# Patient Record
Sex: FEMALE | Race: WHITE | NOT HISPANIC OR LATINO | Employment: FULL TIME | URBAN - METROPOLITAN AREA
[De-identification: names, ages, dates, MRNs, and addresses within clinical notes are randomized per-mention and may not be internally consistent; named-entity substitution may affect disease eponyms.]

---

## 2019-02-11 ENCOUNTER — HISTORICAL (OUTPATIENT)
Dept: ADMINISTRATIVE | Facility: HOSPITAL | Age: 54
End: 2019-02-11

## 2019-02-11 LAB
ABS NEUT (OLG): 3.8 X10(3)/MCL (ref 2.1–9.2)
ALBUMIN SERPL-MCNC: 4.8 GM/DL (ref 3.4–5)
ALBUMIN/GLOB SERPL: 1.85 {RATIO} (ref 1.5–2.5)
ALP SERPL-CCNC: 65 UNIT/L (ref 38–126)
ALT SERPL-CCNC: 41 UNIT/L (ref 7–52)
APPEARANCE, UA: NORMAL
AST SERPL-CCNC: 61 UNIT/L (ref 15–37)
BACTERIA #/AREA URNS AUTO: NORMAL /HPF
BILIRUB SERPL-MCNC: 0.3 MG/DL (ref 0.2–1)
BILIRUB UR QL STRIP: NEGATIVE MG/DL
BILIRUBIN DIRECT+TOT PNL SERPL-MCNC: 0.1 MG/DL (ref 0–0.5)
BILIRUBIN DIRECT+TOT PNL SERPL-MCNC: 0.2 MG/DL
BUN SERPL-MCNC: 13 MG/DL (ref 7–18)
CALCIUM SERPL-MCNC: 9.5 MG/DL (ref 8.5–10)
CHLORIDE SERPL-SCNC: 99 MMOL/L (ref 98–107)
CHOLEST SERPL-MCNC: 183 MG/DL (ref 0–200)
CHOLEST/HDLC SERPL: 3.2 {RATIO}
CO2 SERPL-SCNC: 24 MMOL/L (ref 21–32)
COLOR UR: YELLOW
CREAT SERPL-MCNC: 0.57 MG/DL (ref 0.6–1.3)
CREAT UR-MCNC: 300 MG/DL
ERYTHROCYTE [DISTWIDTH] IN BLOOD BY AUTOMATED COUNT: 12.3 % (ref 11.5–17)
EST. AVERAGE GLUCOSE BLD GHB EST-MCNC: 148 MG/DL
GLOBULIN SER-MCNC: 2.6 GM/DL (ref 1.2–3)
GLUCOSE (UA): NEGATIVE MG/DL
GLUCOSE SERPL-MCNC: 166 MG/DL (ref 74–106)
HBA1C MFR BLD: 6.8 % (ref 4.4–6.4)
HCT VFR BLD AUTO: 42.6 % (ref 37–47)
HDLC SERPL-MCNC: 58 MG/DL (ref 35–60)
HGB BLD-MCNC: 13.5 GM/DL (ref 12–16)
HGB UR QL STRIP: NEGATIVE UNIT/L
KETONES UR QL STRIP: NEGATIVE MG/DL
LDLC SERPL CALC-MCNC: 83 MG/DL (ref 0–129)
LEUKOCYTE ESTERASE UR QL STRIP: NEGATIVE UNIT/L
LYMPHOCYTES # BLD AUTO: 1.5 X10(3)/MCL (ref 0.6–3.4)
LYMPHOCYTES NFR BLD AUTO: 25.3 % (ref 13–40)
MCH RBC QN AUTO: 30.9 PG (ref 27–31.2)
MCHC RBC AUTO-ENTMCNC: 32 GM/DL (ref 32–36)
MCV RBC AUTO: 98 FL (ref 80–94)
MICROALBUMIN UR-MCNC: 10 MG/L
MICROALBUMIN/CREAT RATIO PNL UR: <30 MG/GM
MONOCYTES # BLD AUTO: 0.5 X10(3)/MCL (ref 0.1–1.3)
MONOCYTES NFR BLD AUTO: 8.8 % (ref 0.1–24)
NEUTROPHILS NFR BLD AUTO: 65.9 % (ref 47–80)
NITRITE UR QL STRIP.AUTO: NEGATIVE
PH UR STRIP: 5 [PH]
PLATELET # BLD AUTO: 355 X10(3)/MCL (ref 130–400)
PMV BLD AUTO: 8.4 FL (ref 9.4–12.4)
POTASSIUM SERPL-SCNC: 4.2 MMOL/L (ref 3.5–5.1)
PROT SERPL-MCNC: 7.4 GM/DL (ref 6.4–8.2)
PROT UR QL STRIP: NEGATIVE MG/DL
RBC # BLD AUTO: 4.37 X10(6)/MCL (ref 4.2–5.4)
RBC #/AREA URNS HPF: NORMAL /HPF
SODIUM SERPL-SCNC: 136 MMOL/L (ref 136–145)
SP GR UR STRIP: 1.01
SQUAMOUS EPITHELIAL, UA: NORMAL /LPF
TRIGL SERPL-MCNC: 216 MG/DL (ref 30–150)
UROBILINOGEN UR STRIP-ACNC: 0.2 MG/DL
VLDLC SERPL CALC-MCNC: 43.2 MG/DL
WBC # SPEC AUTO: 5.8 X10(3)/MCL (ref 4.5–11.5)
WBC #/AREA URNS AUTO: NORMAL /[HPF]

## 2020-02-12 ENCOUNTER — HISTORICAL (OUTPATIENT)
Dept: ADMINISTRATIVE | Facility: HOSPITAL | Age: 55
End: 2020-02-12

## 2020-02-12 ENCOUNTER — HISTORICAL (OUTPATIENT)
Dept: LAB | Facility: HOSPITAL | Age: 55
End: 2020-02-12

## 2020-02-12 LAB
APPEARANCE, UA: ABNORMAL
BACTERIA #/AREA URNS AUTO: ABNORMAL /HPF
BILIRUB UR QL STRIP: NEGATIVE MG/DL
CHOLEST SERPL-MCNC: 180 MG/DL (ref 0–200)
CHOLEST/HDLC SERPL: 3.1 {RATIO}
COLOR UR: YELLOW
CREAT UR-MCNC: 100 MG/DL
EST. AVERAGE GLUCOSE BLD GHB EST-MCNC: 137 MG/DL
GLUCOSE (UA): NEGATIVE MG/DL
HBA1C MFR BLD: 6.4 % (ref 4.4–6.4)
HDLC SERPL-MCNC: 58 MG/DL (ref 35–60)
HGB UR QL STRIP: ABNORMAL UNIT/L
KETONES UR QL STRIP: NEGATIVE MG/DL
LDLC SERPL CALC-MCNC: 84 MG/DL (ref 0–129)
LEUKOCYTE ESTERASE UR QL STRIP: ABNORMAL UNIT/L
MICROALBUMIN UR-MCNC: 10 MG/L
MICROALBUMIN/CREAT RATIO PNL UR: <30 MG/GM
NITRITE UR QL STRIP.AUTO: NEGATIVE
PH UR STRIP: 5.5 [PH]
PROT UR QL STRIP: NEGATIVE MG/DL
RBC #/AREA URNS HPF: ABNORMAL /HPF
SP GR UR STRIP: 1.02
SQUAMOUS EPITHELIAL, UA: ABNORMAL /LPF
TRIGL SERPL-MCNC: 249 MG/DL (ref 30–150)
UROBILINOGEN UR STRIP-ACNC: 0.2 MG/DL
VLDLC SERPL CALC-MCNC: 49.8 MG/DL
WBC #/AREA URNS AUTO: ABNORMAL /[HPF]

## 2021-02-17 ENCOUNTER — HISTORICAL (OUTPATIENT)
Dept: ADMINISTRATIVE | Facility: HOSPITAL | Age: 56
End: 2021-02-17

## 2021-02-17 LAB
CHOLEST SERPL-MCNC: 161 MG/DL (ref 0–200)
CHOLEST/HDLC SERPL: 3.3 {RATIO}
CREAT UR-MCNC: 50 MG/DL
EST. AVERAGE GLUCOSE BLD GHB EST-MCNC: 140 MG/DL
HBA1C MFR BLD: 6.5 % (ref 4.4–6.4)
HDLC SERPL-MCNC: 49 MG/DL (ref 35–60)
LDLC SERPL CALC-MCNC: 73 MG/DL (ref 0–129)
MICROALBUMIN UR-MCNC: 10 MG/L
MICROALBUMIN/CREAT RATIO PNL UR: <30 MG/GM
TRIGL SERPL-MCNC: 244 MG/DL (ref 30–150)
VLDLC SERPL CALC-MCNC: 48.8 MG/DL

## 2022-04-09 ENCOUNTER — HISTORICAL (OUTPATIENT)
Dept: ADMINISTRATIVE | Facility: HOSPITAL | Age: 57
End: 2022-04-09

## 2022-04-29 VITALS
BODY MASS INDEX: 28.05 KG/M2 | OXYGEN SATURATION: 99 % | DIASTOLIC BLOOD PRESSURE: 60 MMHG | WEIGHT: 142.88 LBS | HEIGHT: 60 IN | SYSTOLIC BLOOD PRESSURE: 110 MMHG

## 2022-05-02 NOTE — HISTORICAL [facility] CERNER
This is a historical note converted from [facility]. Formatting and pictures may have been removed.  Please reference Rosalba for original formatting and attached multimedia. Chief Complaint  Annual wellness physical- NPO  History of Present Illness  Patient presents for wellness examination.  Since her last office visit,?she was diagnosed with infiltrating?ductal carcinoma of the left breast in?June.??She subsequently underwent a lumpectomy with Dr Roe and had?radiation treatment. ?She is currently on tamoxifen therapy.? She is followed by Dr. Browne.? She last saw her 1/28/2021.  She falls asleep but has problems staying asleep. She feels rested in am. She gets at least 6 hours a night. Her sleep has been affected by her allergies lately.  Her appetite is good.  She walks. She likes to do yard work.  She is a  at [employer].  She is  with 2 daughters: 27 and 19.  She has alcohol once to twice a week.  She does not smoke.  She has tea or coffee once a day.  Colonoscopy 2018: Dr Wolfe; polyps x 2, follow up 5 years.  GYN: Dr Karin Alvarez.  She got a flu shot at pharmacy.  Review of Systems  Constitutional:?no?weight gain,?no?weight loss,?no?fatigue, ?no?fever, ?no?chills, ?no?weakness, ?no?trouble sleeping  Eyes: ?no?vision loss/changes,?+?glasses,??no?pain,??no?redness,??no?blurry or double vision,??no?flashing lights,??no?glaucoma,??no?cataracts  Last eye exam:? July 2020, report in chart  Neck: ?no?lymphadenopathy,??no?thyroid abnormalities,??no?bruits,??no?stiffness  Ears:?no?decreased hearing,??no?tinnitus,??no?earache,??no?drainage?  Nose:?no?congestion,??no?rhinorrhea,??no?epistaxis,??no?sinus pressure, + seasonal allergies, takes Claritin as needed  Throat/Oral:?no?sore throat,??no?hoarseness, ?no?dental caries,??no?gum bleeding,??no?oral lesions  Cardiovascular:?no?chest pain, palpitations, or tightness,?no?dyspnea with exertion,??no?orthopnea,??no?paroxysmal nocturnal dyspnea, +  hypertension, + hypercholesteremia  Respiratory:?no?cough,?no?sputum,??no?hemoptysis,??no?dyspnea,??no?wheezing,??no?pleuritic chest pain?  Gastrointestinal:?no?abdominal pain,?no?nausea,?no?vomiting,??no?heartburn,??no?dysphagia or odynophagia,??no?diarrhea,??no?constipation,??no?melena,??no?hematochezia,?no?jaundice  Urinary:?no?frequency,??no?urgency,??no?burning or pain,?no?hematuria,??no?incontinence,??no?hesitancy,??no?incomplete voiding,??no?flank pain,??no?nocturia, had?a UTI in November.  Musculoskeletal:?no?myalgias,?+?arthralgias: hands,?+?neck pain, sees a chiropractor,??no?back pain,??no?swelling of extremities  Skin:?no?rashes,?no?sores,?no?non-healing wounds  Neurologic:?no?headaches,?no?dizziness/lightheadedness,?no?tremors,?no?paresthesias,??no?seizures,??no?muscle weakness  Psychiatric:?no?depression/sadness,?no?anhedonia,?no?irritability,?no?suicidal ideations,?no?anxiety,?no?panic attacks  Endocrine:?no?hot or cold intolerance,?+ nite?sweats,?no?polyuria,?no?polydipsia,?no?polyphagia, + infiltrating ductal carcinoma  Hematologic:?no?bruising,??no?bleeding disorders?  Physical Exam  Vitals & Measurements  T:?36.9? ?C (Oral)? HR:?103(Peripheral)? BP:?110/60? SpO2:?99%?  HT:?152.00?cm? WT:?64.800?kg? BMI:?28.05? LMP:?11/17/2020 00:00 CST?  PHYSICAL EXAMINATION:  GENERAL: The patient is a well-developed, well-nourished white?female in no?apparent distress. She is alert and oriented x 4.  HEENT: Head is normocephalic and atraumatic. Extraocular muscles are intact. Pupils are equal, round, and reactive to light and accommodation. Nares appeared normal. Mouth is well hydrated and without lesions. Mucous membranes are moist. Posterior pharynx clear of any exudate or lesions.  NECK: Supple. No carotid bruits.? No lymphadenopathy or thyromegaly.  LUNGS: Clear to auscultation.  HEART: Regular rate and rhythm without murmur, gallops or rubs.  ABDOMEN: Soft, nontender, and nondistended.? Positive bowel  sounds.? No hepatosplenomegaly was noted.  EXTREMITIES: Without any cyanosis, clubbing, rash, lesions or edema. Bilateral feet:?Intact to 10 g monofilament x5;?no ulcers, sores or calluses.  NEUROLOGIC: Cranial nerves II through XII are grossly intact.? No motor or sensory deficits.? Cerebellar function intact.  SKIN: No ulceration or induration present.  ?  ?  Assessment/Plan  1.?Wellness examination?Z00.00  Patient presents for wellness examination.  She has been doing well.  She continues on tamoxifen therapy.  She is up-to-date with her colonoscopies.  I advised patient to consider a Shingrix vaccine.  Labs pending:?A1c, microalbumin?and lipid level.  Ordered:  Clinic Follow-Up Wellness, *Est. 02/17/22 3:00:00 CST, Order for future visit, Wellness examination, [facility] Care Est 40-64 years [id number] PC, Wellness examination  Type 2 diabetes mellitus  Hypercholesteremia  Hypertension  Infiltrating ductal carcinoma of left breast, [facility] - [facility], 02/17/21 8:28:00 CST  ?  2.?Type 2 diabetes mellitus?E11.9  A1c, lipid profile microalbumin levels pending.  Eye examination in chart.  Foot exam performed today.  Ordered:  Hemoglobin A1c, Routine collect, 02/17/21 8:38:00 CST, Blood, Stop date 02/17/21 8:38:00 CST, Lab Collect, Type 2 diabetes mellitus, 02/17/21 8:38:00 CST  Lipid Panel, Routine collect, 02/17/21 8:38:00 CST, Blood, Stop date 02/17/21 8:38:00 CST, Lab Collect, Hypercholesteremia  Type 2 diabetes mellitus  Hypertension, 02/17/21 8:38:00 CST  Microalbumin Urine, Routine collect, Urine, 02/17/21 8:38:00 CST, Stop date 02/17/21 8:38:00 CST, Nurse collect, Type 2 diabetes mellitus  [facility] Est 40-64 years [id number] PC, Wellness examination  Type 2 diabetes mellitus  Hypercholesteremia  Hypertension  Infiltrating ductal carcinoma of left breast, [facility]B - [facility], 02/17/21 8:28:00 CST  ?  3.?Hypercholesteremia?E78.00  ?Lipid profile pending.  Refills for simvastatin  sent.  Ordered:  Lipid Panel, Routine collect, 02/17/21 8:38:00 CST, Blood, Stop date 02/17/21 8:38:00 CST, Lab Collect, Hypercholesteremia  Type 2 diabetes mellitus  Hypertension, 02/17/21 8:38:00 CST  [facility] Care Est 40-64 years [id number] PC, Wellness examination  Type 2 diabetes mellitus  Hypercholesteremia  Hypertension  Infiltrating ductal carcinoma of left breast, [facility] - [facility], 02/17/21 8:28:00 CST  ?  4.?Hypertension?I10  Well-controlled; continue current medications, refills sent.  Ordered:  Lipid Panel, Routine collect, 02/17/21 8:38:00 CST, Blood, Stop date 02/17/21 8:38:00 CST, Lab Collect, Hypercholesteremia  Type 2 diabetes mellitus  Hypertension, 02/17/21 8:38:00 CST  [facility] Est 40-64 years [id number] PC, Wellness examination  Type 2 diabetes mellitus  Hypercholesteremia  Hypertension  Infiltrating ductal carcinoma of left breast, [facility] - [facility], 02/17/21 8:28:00 CST  ?  5.?Infiltrating ductal carcinoma of left breast?C50.912  She is currently on tamoxifen therapy; followed by Dr. Browne.  Ordered:  [facility] Est 40-64 years [id number] PC, Wellness examination  Type 2 diabetes mellitus  Hypercholesteremia  Hypertension  Infiltrating ductal carcinoma of left breast, [facility] - [facility], 02/17/21 8:28:00 CST  ?  6.?Insomnia?G47.00  ?Stable;?she takes 1 Xanax in evening.  ?  Orders:  alPRAzolam, See Instructions, TAKE 1 TABLET BY MOUTH EVERY DAY AS NEEDED, # 90 tab(s), 1 Refill(s), Pharmacy: [facility] #[id number], 152, cm, Height/Length Dosing, 02/17/21 7:46:00 CST, 64.8, kg, Weight Dosing, 02/17/21 7:46:00 CST  amLODIPine, See Instructions, TAKE 1 TABLET BY MOUTH DAILY, # 90 tab(s), 3 Refill(s), Pharmacy: [facility] #[id number], 152, cm, Height/Length Dosing, 02/17/21 7:46:00 CST, 64.8, kg, Weight Dosing, 02/17/21 7:46:00 CST  hydrochlorothiazide-valsartan, See Instructions, TAKE 1 TABLET BY MOUTH DAILY, # 90 tab(s), 3 Refill(s), Pharmacy:  [facility] #[id number], 152, cm, Height/Length Dosing, 02/17/21 7:46:00 CST, 64.8, kg, Weight Dosing, 02/17/21 7:46:00 CST  metFORMIN, See Instructions, TAKE 1 TABLET BY MOUTH TWICE DAILY, # 180 tab(s), 3 Refill(s), Pharmacy: [facility] #[id number], 152, cm, Height/Length Dosing, 02/17/21 7:46:00 CST, 64.8, kg, Weight Dosing, 02/17/21 7:46:00 CST  metoprolol, 50 mg = 1 tab(s), Oral, Daily, # 90 tab(s), 3 Refill(s), Pharmacy: [facility] #[id number], 152, cm, Height/Length Dosing, 02/17/21 7:46:00 CST, 64.8, kg, Weight Dosing, 02/17/21 7:46:00 CST  simvastatin, See Instructions, TAKE 1 TABLET BY MOUTH EVERY DAY AFTER EVENING MEAL, # 90 tab(s), 3 Refill(s), Pharmacy: [facility] #[id number], 152, cm, Height/Length Dosing, 02/17/21 7:46:00 CST, 64.8, kg, Weight Dosing, 02/17/21 7:46:00 CST  Referrals  Clinic Follow-Up Wellness, *Est. 02/17/22 3:00:00 CST, Order for future visit, Wellness examination, ink AFP   Problem List/Past Medical History  Ongoing  Hypercholesteremia  Hypertension  Infiltrating ductal carcinoma of left breast  Insomnia  Obesity  Type 2 diabetes mellitus  Historical  No qualifying data  Medications  alPRAzolam 1 mg oral tablet, See Instructions, 1 refills  amlodipine 10 mg oral tablet, See Instructions, 3 refills  Claritin 10 mg oral tablet, 10 mg= 1 tab(s), Oral, Daily, PRN  hydrochlorothiazide-valsartan 25 mg-320 mg oral tablet, See Instructions, 3 refills  latanoprost 0.005% ophthalmic solution, 1 drop(s), Eye-Both, qPM  metformin 500 mg oral tablet, See Instructions, 3 refills  metoprolol succinate 50 mg oral tablet extended release, 50 mg= 1 tab(s), Oral, Daily, 3 refills  MVI with Minerals (Adult Tab), 1 tab(s), Oral, Daily  simvastatin 40 mg oral tablet, See Instructions, 3 refills  tamoxifen 20 mg oral tablet, 20 mg= 1 tab(s), Oral, Daily  Allergies  sulfa drugs?(SOB - Shortness of breath, Rash)  Social History  Abuse/Neglect  No, 02/12/2020  Alcohol  Current, 1-2 times  per week, 11/08/2018  Employment/School  Employed, Work/School description: ., 11/08/2018  Exercise  Exercise type: Walking, GARDENING., 11/08/2018  Home/Environment  Lives with Children, Spouse. Living situation: Home/Independent., 11/08/2018  Nutrition/Health  Regular, Caffeine intake amount: 2 CUPS PER DAY., 11/08/2018  Substance Use  Never, 11/08/2018  Tobacco  Former smoker, quit more than 30 days ago, N/A, 02/12/2020  Former smoker, quit more than 30 days ago, N/A, 02/11/2019  Former smoker, N/A, 11/08/2018  Former smoker, Cigarettes, Started age 18 Years. Stopped age 27 Years., 02/22/2018  Family History  Diabetes mellitus: Father.  Immunizations  Vaccine Date Status   pneumococcal 23-polyvalent vaccine 02/09/2018 Recorded   pneumococcal 13-valent conjugate vaccine 06/29/2015 Recorded   influenza virus vaccine, inactivated 11/25/2014 Recorded   influenza virus vaccine, inactivated 09/15/2013 Recorded   tetanus/diphtheria/pertussis, acel(Tdap) 12/13/2012 Recorded   tetanus-diphth toxoids (Td) adult/adol 11/01/2004 Recorded   Health Maintenance  Health Maintenance  ???Pending?(in the next year)  ??? ??OverDue  ??? ? ? ?Cervical Cancer Screening due??06/21/13??and every 3??year(s)  ??? ? ? ?Hypertension Management-BMP due??02/11/20??and every 1??year(s)  ??? ? ? ?Diabetes Maintenance-Serum Creatinine due??02/11/20??and every 1??year(s)  ??? ? ? ?Influenza Vaccine due??10/01/20??and every 1??day(s)  ??? ? ? ?Diabetes Maintenance-HgbA1c due??02/11/21??and every 1??year(s)  ??? ? ? ?Diabetes Maintenance-Fasting Lipid Profile due??02/12/21??and every 1??year(s)  ??? ??Due?  ??? ? ? ?Alcohol Misuse Screening due??01/02/21??and every 1??year(s)  ??? ? ? ?ADL Screening due??02/17/21??and every 1??year(s)  ??? ? ? ?Aspirin Therapy for CVD Prevention due??02/17/21??and every 1??year(s)  ??? ? ? ?Depression Screening due??02/17/21??Unknown Frequency  ??? ? ? ?Diabetes Maintenance-Foot Exam  due??02/17/21??Unknown Frequency  ??? ? ? ?Hypertension Management-Education due??02/17/21??and every 1??year(s)  ??? ? ? ?Zoster Vaccine due??02/17/21??Unknown Frequency  ??? ??Due In Future?  ??? ? ? ?Diabetes Maintenance-Eye Exam not due until??07/29/21??and every 1??year(s)  ??? ? ? ?Obesity Screening not due until??01/01/22??and every 1??year(s)  ???Satisfied?(in the past 1 year)  ??? ??Satisfied?  ??? ? ? ?Blood Pressure Screening on??02/17/21.??Satisfied by Yuni Dyer LPN  ??? ? ? ?Body Mass Index Check on??02/17/21.??Satisfied by Yuni Dyer LPN  ??? ? ? ?Breast Cancer Screening on??12/29/20.??Satisfied by Thais Liang  ??? ? ? ?Diabetes Maintenance-Eye Exam on??07/29/20.??Satisfied by Thais Liang  ??? ? ? ?Hypertension Management-Blood Pressure on??02/17/21.??Satisfied by Yuni Dyer LPN  ??? ? ? ?Obesity Screening on??02/17/21.??Satisfied by Yuni Dyer LPN  ?      She does have glaucoma which is well controlled.?She also has early cataracts.

## 2022-05-02 NOTE — HISTORICAL [facility] CERNER
This is a historical note converted from [facility]. Formatting and pictures may have been removed.  Please reference Rosalba for original formatting and attached multimedia. Chief Complaint  ANNUAL WELLNESS PHYSICAL- NPO  History of Present Illness  Patient presents for wellness examination.  Since her last office visit,?she was diagnosed with infiltrating?ductal carcinoma of the left breast in?June.??She subsequently underwent a lumpectomy with Dr Roe and had?radiation treatment. ?She is currently on tamoxifen therapy.? She is followed by Dr. Browne.  She falls asleep but has problems staying asleep. She feels rested in am. She gets at least 6 hours a night.  Her appetite is good.  She walks. She likes to do yard work.  She is a  at [employer].  She is  with 2 daughters: 26 and 18.  She has alcohol once to twice a week.  She does not smoke.  She has tea or coffee once a day.  Colonoscopy 2018: Dr Wolfe; polyps x 2, follow up 5 years.  Gyn: Dr Karin Alvarez.  She got a flu shot at pharmacy.  Review of Systems  Constitutional:?no?weight gain,?no?weight loss,?no?fatigue, ?no?fever, ?no?chills, ?no?weakness, ?no?trouble sleeping  Eyes: ?no?vision loss/changes,?+?glasses,??no?pain,??no?redness,??no?blurry or double vision,??no?flashing lights,??no?glaucoma,??no?cataracts  Last eye exam:?2018  Neck: ?no?lymphadenopathy,??no?thyroid abnormalities,??no?bruits,??no?stiffness  Ears:?no?decreased hearing,??no?tinnitus,??no?earache,??no?drainage?  Nose:?no?congestion,??no?rhinorrhea,??no?epistaxis,??no?sinus pressure, + seasonal allergies, takes Claritin as needed  Throat/Oral:?no?sore throat,??no?hoarseness, ?no?dental caries,??no?gum bleeding,??no?oral lesions  Cardiovascular:?no?chest pain, palpitations, or tightness,?no?dyspnea with exertion,??no?orthopnea,??no?paroxysmal nocturnal dyspnea  Respiratory:?no?cough,?no?sputum,??no?hemoptysis,??no?dyspnea,??no?wheezing,??no?pleuritic chest  pain?  Gastrointestinal:?no?abdominal pain,?no?nausea,?no?vomiting,??no?heartburn,??no?dysphagia or odynophagia,??no?diarrhea,??no?constipation,??no?melena,??no?hematochezia,?no?jaundice  Urinary:?no?frequency,??no?urgency,??no?burning or pain,?no?hematuria,??no?incontinence,??no?hesitancy,??no?incomplete voiding,??no?flank pain,??no?nocturia  Musculoskeletal:?no?myalgias,?+?arthralgias: hands,?+?neck pain, sees a chiropractor,??no?back pain,??no?swelling of extremities  Skin:?no?rashes,?no?sores,?no?non-healing wounds  Neurologic:?no?headaches,?no?dizziness/lightheadedness,?no?tremors,?no?paresthesias,??no?seizures,??no?muscle weakness  Psychiatric:?no?depression/sadness,?no?anhedonia,?no?irritability,?no?suicidal ideations,?no?anxiety,?no?panic attacks  Endocrine:?no?hot or cold intolerance,?no?sweating,?no?polyuria,?no?polydipsia,?no?polyphagia  Hematologic:?no?bruising,??no?bleeding disorders?  Physical Exam  Vitals & Measurements  HR:?103(Peripheral)? BP:?100/60? SpO2:?98%?  HT:?152?cm? WT:?67.7?kg? BMI:?29.3? LMP:?07/22/2019 00:00 CDT?  PHYSICAL EXAMINATION:  GENERAL: The patient is a well-developed, well-nourished white?female in no?apparent distress. She is alert and oriented x 4.  HEENT: Head is normocephalic and atraumatic. Extraocular muscles are intact. Pupils are equal, round, and reactive to light and accommodation. Nares appeared normal. Mouth is well hydrated and without lesions. Mucous membranes are moist. Posterior pharynx clear of any exudate or lesions.  NECK: Supple. No carotid bruits.? No lymphadenopathy or thyromegaly.  LUNGS: Clear to auscultation.  HEART: Regular rate and rhythm without murmur.  ABDOMEN: Soft, nontender, and nondistended.? Positive bowel sounds.? No hepatosplenomegaly was noted.  EXTREMITIES: Without any cyanosis, clubbing, rash, lesions or edema. ?Bilateral feet:?Intact to 10 g monofilament x 5 bilaterally, no ulcers, sores or calluses.  NEUROLOGIC: Cranial nerves II  through XII are grossly intact.? No motor or sensory deficits.? Cerebellar function intact.  SKIN: No ulceration or induration present.  ?  Assessment/Plan  1.?Wellness examination?Z00.00  ?Patient presents for wellness examination.? She looks well.  Patient was diagnosed with?infiltrating ductal carcinoma of her left breast; she is doing well.  Patient had a colonoscopy in 2018.  She had a flu shot.  We will check her immunity status for varicella in regards to her Zostavax.  ?  Ordered:  Clinic Follow-Up Wellness, *Est. 02/12/21 3:00:00 CST, Order for future visit, Wellness examination, [facility] AFP  Lipid Panel, Routine collect, 02/12/20 8:48:00 CST, Blood, Stop date 02/12/20 8:48:00 CST, Lab Collect, Wellness examination  Hypercholesteremia  Type 2 diabetes mellitus  Hypertension, 02/12/20 8:48:00 CST  Preventative Health Care Est 40-64 years [id number] PC, Wellness examination  Type 2 diabetes mellitus  Hypertension  Hypercholesteremia  Insomnia  Infiltrating ductal carcinoma of left breast, HLINK AMB - AFP, 02/12/20 8:42:00 CST  Urinalysis Complete no reflex, Routine collect, Urine, 02/12/20 8:48:00 CST, Stop date 02/12/20 8:48:00 CST, Nurse collect, Wellness examination  Type 2 diabetes mellitus  Hypertension  ?  2.?Type 2 diabetes mellitus?E11.9  Check A1c today.  Microalbumin level pending.  Foot exam performed today.  Patient to have eye examination?soon.? Her last?eye examination was in 2018.  Ordered:  Hemoglobin A1c, Routine collect, 02/12/20 8:48:00 CST, Blood, Stop date 02/12/20 8:48:00 CST, Lab Collect, Type 2 diabetes mellitus, 02/12/20 8:48:00 CST  Lipid Panel, Routine collect, 02/12/20 8:48:00 CST, Blood, Stop date 02/12/20 8:48:00 CST, Lab Collect, Wellness examination  Hypercholesteremia  Type 2 diabetes mellitus  Hypertension, 02/12/20 8:48:00 CST  Microalbumin Urine, Routine collect, Urine, 02/12/20 8:48:00 CST, Stop date 02/12/20 8:48:00 CST, Nurse collect, Type 2 diabetes  mellitus  [facility] Est 40-64 years [id number] PC, Wellness examination  Type 2 diabetes mellitus  Hypertension  Hypercholesteremia  Insomnia  Infiltrating ductal carcinoma of left breast, HLINK AMB - AFP, 02/12/20 8:42:00 CST  Urinalysis Complete no reflex, Routine collect, Urine, 02/12/20 8:48:00 CST, Stop date 02/12/20 8:48:00 CST, Nurse collect, Wellness examination  Type 2 diabetes mellitus  Hypertension  ?  3.?Hypertension?I10  ?Well-controlled; continue current medications.  Ordered:  Lipid Panel, Routine collect, 02/12/20 8:48:00 CST, Blood, Stop date 02/12/20 8:48:00 CST, Lab Collect, Wellness examination  Hypercholesteremia  Type 2 diabetes mellitus  Hypertension, 02/12/20 8:48:00 CST  [facility] 40-64 years [id number] PC, Wellness examination  Type 2 diabetes mellitus  Hypertension  Hypercholesteremia  Insomnia  Infiltrating ductal carcinoma of left breast, HLINK AMB - AFP, 02/12/20 8:42:00 CST  Urinalysis Complete no reflex, Routine collect, Urine, 02/12/20 8:48:00 CST, Stop date 02/12/20 8:48:00 CST, Nurse collect, Wellness examination  Type 2 diabetes mellitus  Hypertension  ?  4.?Hypercholesteremia?E78.00  Check lipid panel today.  Ordered:  Lipid Panel, Routine collect, 02/12/20 8:48:00 CST, Blood, Stop date 02/12/20 8:48:00 CST, Lab Collect, Wellness examination  Hypercholesteremia  Type 2 diabetes mellitus  Hypertension, 02/12/20 8:48:00 CST  [facility] Est 40-64 years [id number] PC, Wellness examination  Type 2 diabetes mellitus  Hypertension  Hypercholesteremia  Insomnia  Infiltrating ductal carcinoma of left breast, [facility]LINK AMB - AFP, 02/12/20 8:42:00 CST  ?  5.?Insomnia?G47.00  She states her sleeping is actually improved and she is getting more hours;?she declines any?medication at this time.  Ordered:  [facility] 40-64 years [id number] PC, Wellness examination  Type 2 diabetes mellitus  Hypertension  Hypercholesteremia   Insomnia  Infiltrating ductal carcinoma of left breast, [facility]LINK AMB - AFP, 02/12/20 8:42:00 CST  ?  6.?Infiltrating ductal carcinoma of left breast?C50.912  She is currently on?tamoxifen; followed by Dr. Browne.  She is status post lumpectomy and radiation treatment.  Ordered:  [facility] Care Est 40-64 years [id number] PC, Wellness examination  Type 2 diabetes mellitus  Hypertension  Hypercholesteremia  Insomnia  Infiltrating ductal carcinoma of left breast, [facility]LINK AMB - AFP, 02/12/20 8:42:00 CST  ?  7.?Immunity status testing?Z01.84  ?Patient states she does not have a history of chickenpox.? Will check immunity status for Zostavax purposes.  Ordered:  Varicella Zoster Virus IgG Ab-[facility] [id number], Routine collect, 02/12/20 8:41:00 CST, Blood, Order for future visit, Stop date 02/12/20 8:41:00 CST, Lab Collect, Immunity status testing, 02/12/20 8:41:00 CST  ?  Orders:  alPRAzolam, See Instructions, TAKE 1 TABLET BY MOUTH EVERY DAY AS NEEDED, # 90 tab(s), 1 Refill(s), Pharmacy: [facility] #[id number], 152, cm, Height/Length Dosing, 02/12/20 7:41:00 CST, 67.7, kg, Weight Dosing, 02/12/20 7:41:00 CST  amLODIPine, See Instructions, TAKE 1 TABLET BY MOUTH DAILY, # 90 tab(s), 3 Refill(s), Pharmacy: [facility] #[id number], 152, cm, Height/Length Dosing, 02/12/20 7:41:00 CST, 67.7, kg, Weight Dosing, 02/12/20 7:41:00 CST  hydrochlorothiazide-valsartan, See Instructions, TAKE 1 TABLET BY MOUTH DAILY, # 90 tab(s), 3 Refill(s), Pharmacy: [facility] #[id number], 152, cm, Height/Length Dosing, 02/12/20 7:41:00 CST, 67.7, kg, Weight Dosing, 02/12/20 7:41:00 CST  metFORMIN, See Instructions, TAKE 1 TABLET BY MOUTH TWICE DAILY, # 180 tab(s), 3 Refill(s), Pharmacy: [facility] #[id number], 152, cm, Height/Length Dosing, 02/12/20 7:41:00 CST, 67.7, kg, Weight Dosing, 02/12/20 7:41:00 CST  metoprolol, 50 mg, Oral, Daily, # 90 cap(s), 3 Refill(s), Pharmacy: [facility] #[id number], 152, cm,  Height/Length Dosing, 02/12/20 7:41:00 CST, 67.7, kg, Weight Dosing, 02/12/20 7:41:00 CST  simvastatin, See Instructions, TAKE 1 TABLET BY MOUTH EVERY DAY AFTER EVENING MEAL, # 90 tab(s), 3 Refill(s), Pharmacy: [facility] #[id number], 152, cm, Height/Length Dosing, 02/12/20 7:41:00 CST, 67.7, kg, Weight Dosing, 02/12/20 7:41:00 CST  Referrals  Clinic Follow-Up Wellness, *Est. 02/12/21 3:00:00 CST, Order for future visit, Wellness examination, [facility]Link AFP   Problem List/Past Medical History  Ongoing  Hypercholesteremia  Hypertension  Insomnia  Obesity  Type 2 diabetes mellitus  Historical  No qualifying data  Medications  alPRAzolam 1 mg oral tablet, See Instructions, 1 refills  amlodipine 10 mg oral tablet, See Instructions, 3 refills  Claritin 10 mg oral tablet, 10 mg= 1 tab(s), Oral, Daily, PRN  hydrochlorothiazide-valsartan 25 mg-320 mg oral tablet, See Instructions, 3 refills  metformin 500 mg oral tablet, See Instructions, 3 refills  metoprolol succinate 50 mg oral capsule, extended release, 50 mg, Oral, Daily, 3 refills  MVI with Minerals (Adult Tab), 1 tab(s), Oral, Daily  simvastatin 40 mg oral tablet, See Instructions, 3 refills  tamoxifen 20 mg oral tablet, 20 mg= 1 tab(s), Oral, Daily  Allergies  sulfa drugs?(SOB - Shortness of breath, Rash)  Social History  Abuse/Neglect  No, 02/12/2020  Alcohol  Current, 1-2 times per week, 11/08/2018  Employment/School  Employed, Work/School description: ., 11/08/2018  Exercise  Exercise type: Walking, GARDENING., 11/08/2018  Home/Environment  Lives with Children, Spouse. Living situation: Home/Independent., 11/08/2018  Nutrition/Health  Regular, Caffeine intake amount: 2 CUPS PER DAY., 11/08/2018  Substance Use  Never, 11/08/2018  Tobacco  Former smoker, quit more than 30 days ago, N/A, 02/12/2020  Former smoker, quit more than 30 days ago, N/A, 02/11/2019  Former smoker, N/A, 11/08/2018  Former smoker, Cigarettes, Started age 18 Years. Stopped  age 27 Years., 02/22/2018  Family History  Diabetes mellitus: Father.  Immunizations  Vaccine Date Status   pneumococcal 23-polyvalent vaccine 02/09/2018 Recorded   pneumococcal 13-valent conjugate vaccine 06/29/2015 Recorded   influenza virus vaccine, inactivated 11/25/2014 Recorded   influenza virus vaccine, inactivated 09/15/2013 Recorded   tetanus/diphtheria/pertussis, acel(Tdap) 12/13/2012 Recorded   tetanus-diphth toxoids (Td) adult/adol 11/01/2004 Recorded   Health Maintenance  Health Maintenance  ???Pending?(in the next year)  ??? ??OverDue  ??? ? ? ?Diabetes Maintenance-Fasting Lipid Profile due??02/11/20??and every 1??year(s)  ??? ? ? ?Diabetes Maintenance-HgbA1c due??02/11/20??and every 1??year(s)  ??? ? ? ?Hypertension Management-BMP due??02/11/20??and every 1??year(s)  ??? ? ? ?Diabetes Maintenance-Serum Creatinine due??02/11/20??and every 1??year(s)  ??? ? ? ?Diabetes Maintenance-Urine Dipstick due??02/11/20??and every 1??year(s)  ??? ??Due?  ??? ? ? ?Alcohol Misuse Screening due??01/01/20??and every 1??year(s)  ??? ? ? ?ADL Screening due??02/12/20??and every 1??year(s)  ??? ? ? ?Aspirin Therapy for CVD Prevention due??02/12/20??and every 1??year(s)  ??? ? ? ?Depression Screening due??02/12/20??and every?  ??? ? ? ?Diabetes Maintenance-Eye Exam due??02/12/20??and every?  ??? ? ? ?Diabetes Maintenance-Foot Exam due??02/12/20??and every?  ??? ? ? ?Hypertension Management-Education due??02/12/20??and every 1??year(s)  ??? ??Due In Future?  ??? ? ? ?Obesity Screening not due until??01/01/21??and every 1??year(s)  ??? ? ? ?Blood Pressure Screening not due until??02/11/21??and every 1??year(s)  ??? ? ? ?Body Mass Index Check not due until??02/11/21??and every 1??year(s)  ??? ? ? ?Hypertension Management-Blood Pressure not due until??02/11/21??and every 1??year(s)  ???Satisfied?(in the past 1 year)  ??? ??Satisfied?  ??? ? ? ?Blood Pressure Screening on??02/12/20.??Satisfied by Yuni Dyer LPN  ??? ? ?  ?Body Mass Index Check on??02/12/20.??Satisfied by Yuni Dyer LPN  ??? ? ? ?Breast Cancer Screening on??01/28/20.??Satisfied by Thais Liang  ??? ? ? ?Hypertension Management-Blood Pressure on??02/12/20.??Satisfied by Yuni Dyer LPN  ??? ? ? ?Obesity Screening on??02/12/20.??Satisfied by Yuni Dyer LPN  ?

## 2022-08-03 PROBLEM — G47.00 INSOMNIA: Status: ACTIVE | Noted: 2022-08-03

## 2022-08-03 PROBLEM — C50.912 INFILTRATING DUCTAL CARCINOMA OF LEFT BREAST: Status: ACTIVE | Noted: 2022-08-03

## 2022-08-03 PROBLEM — E11.9 TYPE 2 DIABETES MELLITUS WITHOUT COMPLICATIONS: Status: ACTIVE | Noted: 2022-08-03

## 2022-08-03 PROBLEM — I10 ESSENTIAL (PRIMARY) HYPERTENSION: Status: ACTIVE | Noted: 2022-08-03

## 2022-08-03 PROBLEM — E78.00 HYPERCHOLESTEREMIA: Status: ACTIVE | Noted: 2022-08-03

## 2023-03-01 PROBLEM — Z00.00 ENCOUNTER FOR WELLNESS EXAMINATION IN ADULT: Status: ACTIVE | Noted: 2023-03-01

## 2023-03-01 PROBLEM — H40.9 GLAUCOMA: Status: ACTIVE | Noted: 2023-03-01

## 2023-03-03 PROBLEM — Z23 IMMUNIZATION DUE: Status: ACTIVE | Noted: 2023-03-03

## 2023-03-03 PROBLEM — J30.2 SEASONAL ALLERGIES: Status: ACTIVE | Noted: 2023-03-03

## 2023-06-05 PROBLEM — Z00.00 ENCOUNTER FOR WELLNESS EXAMINATION IN ADULT: Status: RESOLVED | Noted: 2023-03-01 | Resolved: 2023-06-05

## 2023-06-14 PROBLEM — M25.562 CHRONIC PAIN OF LEFT KNEE: Status: ACTIVE | Noted: 2023-06-14

## 2023-06-14 PROBLEM — G89.29 CHRONIC PAIN OF LEFT KNEE: Status: ACTIVE | Noted: 2023-06-14

## 2024-03-02 PROBLEM — F51.01 PRIMARY INSOMNIA: Status: ACTIVE | Noted: 2022-08-03

## 2024-03-02 NOTE — PROGRESS NOTES
..Annual Exam       HISTORY OF PRESENT ILLNESS    This 59 y.o. female patient presents to the clinic today for a wellness CPX.  She has been feeling well.  She has been sleeping okay; she has problems staying asleep. She falls asleep well.  Her appetite has been good.  She is  with 2 daughters.  She is a  at [employer].  She does not smoke.  She has alcohol once or twice a week.  Colonoscopy 2023: Dr Wolfe; normal, follow up in 7 years.  Oncology: Dr Browne: 2023. She is retiring; she will be seeing someone new.   Last mammogram: 2023.  She does gardening almost daily. She walks 7000 steps a day.      The patient's Health Maintenance was reviewed and the following appears to be due at this time:   Health Maintenance Due   Topic Date Due    Hepatitis C Screening  Never done    Cervical Cancer Screening  Never done    HIV Screening  Never done    Shingles Vaccine (1 of 2) Never done    Pneumococcal Vaccines (Age 0-64) (3 of 3 - PPSV23 or PCV20) 2023    Lipid Panel  2023    Influenza Vaccine (1) 2023    COVID-19 Vaccine (3 - -24 season) 2023    Diabetes Urine Screening  2024    Hemoglobin A1c  2024       ..  Past Medical History:   Diagnosis Date    Essential (primary) hypertension     Hypercholesteremia     Infiltrating ductal carcinoma of left breast           ..  Past Surgical History:   Procedure Laterality Date    BREAST LUMPECTOMY  2019     SECTION  2001     SECTION  1993    COLONOSCOPY  2018            Current Outpatient Medications:     amLODIPine (NORVASC) 10 MG tablet, Take 1 tablet (10 mg total) by mouth once daily., Disp: 90 tablet, Rfl: 3    brimonidine-timoloL (COMBIGAN) 0.2-0.5 % Drop, INSTILL 1 DROP INTO RIGHT EYE TWICE DAILY TO LOWER INTRAOCULAR PRESSURE, Disp: , Rfl:     latanoprost 0.005 % ophthalmic solution, INSTILL 1 DROP INTO BOTH EYES EVERY NIGHT AT BEDTIME TO LOWER INTRAOCULAR  PRESSURE, Disp: , Rfl:     tamoxifen (NOLVADEX) 20 MG Tab, Take 20 mg by mouth once daily., Disp: , Rfl:     timolol maleate 0.5% (TIMOPTIC) 0.5 % Drop, INSTILL 1 DROP IN BOTH EYES TWICE DAILY TO LOWER INTRAOCULAR PRESSURE, Disp: , Rfl:     ALPRAZolam (XANAX) 1 MG tablet, TAKE 1 TABLET(1 MG) BY MOUTH EVERY NIGHT AS NEEDED FOR INSOMNIA OR ANXIETY, Disp: 90 tablet, Rfl: 1    cyclobenzaprine (FLEXERIL) 5 MG tablet, Take 1 tablet once to twice a day as needed for muscle spasm., Disp: 20 tablet, Rfl: 1    metFORMIN (GLUCOPHAGE) 500 MG tablet, TAKE 1 TABLET BY MOUTH EVERY MORNING AND TAKE 2 TABLET BY MOUTH EVERY EVENING, Disp: 270 tablet, Rfl: 3    metoprolol succinate (TOPROL-XL) 50 MG 24 hr tablet, Take 1 tablet (50 mg total) by mouth once daily., Disp: 90 tablet, Rfl: 3    simvastatin (ZOCOR) 40 MG tablet, TAKE 1 TABLET BY MOUTH EVERY DAY AFTER THE EVENING MEAL, Disp: 90 tablet, Rfl: 3    valsartan-hydrochlorothiazide (DIOVAN-HCT) 320-25 mg per tablet, Take 1 tablet by mouth once daily., Disp: 90 tablet, Rfl: 3      ..  Social History     Socioeconomic History    Marital status:     Number of children: 2   Occupational History    Occupation:    Tobacco Use    Smoking status: Former     Types: Cigarettes    Smokeless tobacco: Never    Tobacco comments:     Age Started: 18; Age Stopped: 27   Substance and Sexual Activity    Alcohol use: Yes     Comment: 1-2 times per week    Drug use: Never     Social Determinants of Health     Financial Resource Strain: Low Risk  (2/26/2024)    Overall Financial Resource Strain (CARDIA)     Difficulty of Paying Living Expenses: Not very hard   Food Insecurity: No Food Insecurity (2/26/2024)    Hunger Vital Sign     Worried About Running Out of Food in the Last Year: Never true     Ran Out of Food in the Last Year: Never true   Transportation Needs: No Transportation Needs (2/26/2024)    PRAPARE - Transportation     Lack of Transportation (Medical): No      Lack of Transportation (Non-Medical): No   Physical Activity: Insufficiently Active (2/26/2024)    Exercise Vital Sign     Days of Exercise per Week: 6 days     Minutes of Exercise per Session: 20 min   Stress: Stress Concern Present (2/26/2024)    [employer] [place] [employer] - Occupational Stress Questionnaire     Feeling of Stress : To some extent   Social Connections: Unknown (2/26/2024)    Social Connection and Isolation Panel [NHANES]     Frequency of Communication with Friends and Family: More than three times a week     Frequency of Social Gatherings with Friends and Family: More than three times a week     Active Member of Clubs or Organizations: No     Attends Club or Organization Meetings: Patient declined     Marital Status:    Housing Stability: Low Risk  (2/26/2024)    Housing Stability Vital Sign     Unable to Pay for Housing in the Last Year: No     Number of Places Lived in the Last Year: 1     Unstable Housing in the Last Year: No          ..  Family History   Problem Relation Age of Onset    Skin cancer Mother     Kidney disease Mother     Diabetes Father     Heart disease Father     Stroke Father           ..  Review of patient's allergies indicates:   Allergen Reactions    Sulfa (sulfonamide antibiotics) Rash and Shortness Of Breath          ..  Immunization History   Administered Date(s) Administered    COVID-19, MRNA, LN-S, PF (Pfizer) (Purple Cap) 03/26/2021, 04/16/2021    Influenza - Trivalent (ADULT) 09/15/2013, 11/25/2014    Influenza - Trivalent - PF (ADULT) 09/15/2013, 11/25/2014    Pneumococcal Conjugate - 13 Valent 06/29/2015    Pneumococcal Polysaccharide - 23 Valent 02/09/2018    Td (ADULT) 11/01/2004    Tdap 12/13/2012, 03/03/2023          REVIEW OF SYSTEMS:    GENERAL:   no unexplained wt gain/loss,  fever, fatigue, chills, night sweats or weakness  HEENT: no sore throat, ear pain, sinus pressure, nasal congestion, or rhinorrhea, + seasonal allergies, take  Claritin as needed  VISION: no vision changes, + glaucoma, + cataracts, last eye exam 8/03/2023; Dr Campbell Dial  CARDIAC: no chest pain, palpitations, dyspnea on exertion, orthopnea  RESPIRATORY: no cough, wheezing, sputum production, or SOB  GI: no abdominal pain, n&v, no heartburn, constipation, diarrhea,  blood in stool or  (--)family history of colon cancer    : no dysuria, hematuria, frequency,  urgency, incontinence,  vaginal discharge,  abnormal vaginal bleeding  MUSC/SKEL:  no myalgia, weakness, edema, + arthralgias: osteoarthritis left knee, or joint effusion  SKIN:  No rash, hives, itching or sores  NEURO:  No headaches, numbness,  tingling, weakness, or dizziness  PSYCH:  No anxiety,  depression,  irritability,  suicidal ideation or hallucinations  ENDO:  No polyuria, polydipsia,  polyphagia  HEME:  No bruising,  lymphadenopathy, bleeding disorders, no anemia     PHYSICAL EXAM:    Vitals:    03/04/24 0811   BP: 109/74   Pulse: 99   Temp: 98.2 °F (36.8 °C)       GENERAL:  Well-developed well-nourished white female in NAD, alert and oriented x 3  SKIN:  no rash or abnormal appearing skin lesions  HEENT:  PERRLA, EOMI, mouth wnl, throat wnl, EAC and TM wnl bilaterally  NECK:  FROM, no lymphadenopathy, no thyroid abnormalities palpable  CHEST:  CTA bilaterally no wheezes, crackles or rubs  CARDIAC:  RRR, no murmurs audible  ABDOMEN:  Soft, nontender, nondistended, NBSx4, no rebound or guarding, no HSM  EXTREMITIES:  no clubbing, cyanosis, or edema.  joints wnl. +2 DP/PT pulse bilaterally  NEURO:  no sensory or motor deficits noted. CN II-XII intact. Gait wnl.Protective Sensation (w/ 10 gram monofilament):  Right: Intact  Left: Intact    Visual Inspection:  Normal -  Bilateral    Pedal Pulses:   Right: Present  Left: Present    Posterior Tibialis Pulses:   Right:Present  Left: Present           ASSESSMENT AND PLAN     1. Encounter for wellness examination in adult  Overview:  Patient presents for wellness  examination.    She is been feeling well.    She sleeps well with alprazolam.    Her hypertension is well controlled.  She is physically active; she does yd work and walks regularly.    Last colonoscopy 2018:  Dr. Wolfe; polyps x2, she is due for follow-up this year.  Gyn: Dr. Karin Alvarez; last office visit August 2022.  Oncologist:  Dr. Browne; sorry in January; has a mammogram scheduled in July.  Eye doctor: Dr. Dial; sees him every 6 months.  Tdap administered.    A1c and microalbumin level pending.  Remainder labs performed by oncology twice yearly.    03/04/2024: Patient presents for wellness examination.    She has been feeling well.  Colonoscopy 06/30/2023: Dr Wolfe; normal, follow-up in 7 years.    Oncologist: Dr Browne who will be retiring.  She will be seeing a new oncologist.  History of breast cancer; has been in remission since 2019.    Last mammogram 07/05/2023.  Patient encouraged to remain physically active.    Patient encouraged to make healthy food choices and limit portions.  Patient encouraged to get shingles vaccine at her pharmacy.  Labs pending.    Orders:  -     Basic Metabolic Panel; Future; Expected date: 03/04/2024  -     Urinalysis; Future; Expected date: 03/04/2024    2. Type 2 diabetes mellitus without complication, without long-term current use of insulin  Overview:  On Metformin 500 twice daily.  Continue ADA diet; limit intake of sugary foods and refined carbohydrates.  Eye exam 7/2022 Campbell Dial MD; she sees him every 6 months.  Foot exam 3/2023.  Microalbumin 3/2023--on ACEi.  A1c and microalbumin pending.    3/04/2024:   Continue ADA diet; limit intake of sugary foods refined carbohydrates.    Patient encouraged to remain physically active and to lose weight.    Last eye exam 08/03/2023.    Foot exam performed today.    A1c and microalbumin level pending.    Last eye exam 8/03/2023: Dr Campbell Dial.  Foot exam today.      Orders:  -     metFORMIN (GLUCOPHAGE) 500  MG tablet; TAKE 1 TABLET BY MOUTH EVERY MORNING AND TAKE 2 TABLET BY MOUTH EVERY EVENING  Dispense: 270 tablet; Refill: 3  -     Basic Metabolic Panel; Future; Expected date: 03/04/2024  -     Lipid Panel; Future; Expected date: 03/04/2024  -     Hemoglobin A1C; Future; Expected date: 03/04/2024  -     Microalbumin/Creatinine Ratio, Urine; Future; Expected date: 03/04/2024  -     Urinalysis; Future; Expected date: 03/04/2024    3. Essential (primary) hypertension  Overview:  Well controlled; continue current medications.  Refills sent.    Limit sodium consumption.    03/04/2024:  Her blood pressure is well controlled; continue current medications.  Refills sent.    Low-sodium diet.    Patient encouraged to lose weight.    Orders:  -     metoprolol succinate (TOPROL-XL) 50 MG 24 hr tablet; Take 1 tablet (50 mg total) by mouth once daily.  Dispense: 90 tablet; Refill: 3  -     Basic Metabolic Panel; Future; Expected date: 03/04/2024  -     Urinalysis; Future; Expected date: 03/04/2024    4. Hypercholesteremia  Overview:  Patient has been compliant with simvastatin; refills sent.    Continue low-cholesterol/low-fat diet.    Lipid profile checked by oncologist.    03/04/2024: Patient has been compliant medications; refills sent.    Continue low-cholesterol/low-fat diet.    Lipid profile pending.    Orders:  -     Lipid Panel; Future; Expected date: 03/04/2024    5. Infiltrating ductal carcinoma of left breast  Overview:  Patient has been in remission since 2019.    She is currently on tamoxifen therapy.    She sees Dr. Browne every 6 months.    03/04/2024:  Patient has been in remission since 2019.    She is currently on tamoxifen therapy.    She sees Dr. Browne every 6 months.  Dr. Browne will be retiring; she will be seeing another oncologist.      6. Glaucoma of both eyes, unspecified glaucoma type  Overview:  Stable; followed by Dr. Campbell Dial.     3/04/2024:   Stable; followed by Dr. Campbell Dial.        7. Seasonal allergies  Overview:  Stable; takes Claritin as needed.    03/04/2024:  Stable; takes Claritin as needed.      8. Primary insomnia  Overview:  Xanax nightly. Stable.    03/04/2024:  Patient has been sleeping well; continue alprazolam.  Refills sent.      9. Chronic pain of left knee  Overview:  Chronic; Xray today.  Diclofenac 50mg BID PRN.  Ice.  Knee brace.  Rest.  Low impact exercises as tolerated.     03/04/2024:  Patient states her knee has been doing okay; she has not been taking diclofenac.    She takes ibuprofen as needed.      10. Screening declined by patient  Overview:  Patient declines screening for hepatitis-C and HIV; benefits and risks reviewed with patient.      11. Immunization due  Overview:  Tdap 0.5 IM administered; benefits/potential risks/side effects discussed with patient.    03/04/2024: Patient encouraged to consider Shingrix vaccine; benefits and risks reviewed with patient.      Other orders  -     cyclobenzaprine (FLEXERIL) 5 MG tablet; Take 1 tablet once to twice a day as needed for muscle spasm.  Dispense: 20 tablet; Refill: 1  -     ALPRAZolam (XANAX) 1 MG tablet; TAKE 1 TABLET(1 MG) BY MOUTH EVERY NIGHT AS NEEDED FOR INSOMNIA OR ANXIETY  Dispense: 90 tablet; Refill: 1  -     simvastatin (ZOCOR) 40 MG tablet; TAKE 1 TABLET BY MOUTH EVERY DAY AFTER THE EVENING MEAL  Dispense: 90 tablet; Refill: 3  -     valsartan-hydrochlorothiazide (DIOVAN-HCT) 320-25 mg per tablet; Take 1 tablet by mouth once daily.  Dispense: 90 tablet; Refill: 3       ..Follow up in about 1 year (around 3/4/2025) for Wellness exam.

## 2024-03-04 ENCOUNTER — OFFICE VISIT (OUTPATIENT)
Dept: PRIMARY CARE CLINIC | Facility: CLINIC | Age: 59
End: 2024-03-04
Payer: COMMERCIAL

## 2024-03-04 VITALS
TEMPERATURE: 98 F | HEART RATE: 99 BPM | WEIGHT: 145 LBS | BODY MASS INDEX: 29.23 KG/M2 | HEIGHT: 59 IN | DIASTOLIC BLOOD PRESSURE: 74 MMHG | SYSTOLIC BLOOD PRESSURE: 109 MMHG | OXYGEN SATURATION: 96 %

## 2024-03-04 DIAGNOSIS — G89.29 CHRONIC PAIN OF LEFT KNEE: ICD-10-CM

## 2024-03-04 DIAGNOSIS — E78.00 HYPERCHOLESTEREMIA: ICD-10-CM

## 2024-03-04 DIAGNOSIS — F51.01 PRIMARY INSOMNIA: ICD-10-CM

## 2024-03-04 DIAGNOSIS — M25.562 CHRONIC PAIN OF LEFT KNEE: ICD-10-CM

## 2024-03-04 DIAGNOSIS — C50.912 INFILTRATING DUCTAL CARCINOMA OF LEFT BREAST: ICD-10-CM

## 2024-03-04 DIAGNOSIS — Z53.20 SCREENING DECLINED BY PATIENT: ICD-10-CM

## 2024-03-04 DIAGNOSIS — E11.9 TYPE 2 DIABETES MELLITUS WITHOUT COMPLICATION, WITHOUT LONG-TERM CURRENT USE OF INSULIN: ICD-10-CM

## 2024-03-04 DIAGNOSIS — Z00.00 ENCOUNTER FOR WELLNESS EXAMINATION IN ADULT: Primary | ICD-10-CM

## 2024-03-04 DIAGNOSIS — I10 ESSENTIAL (PRIMARY) HYPERTENSION: ICD-10-CM

## 2024-03-04 DIAGNOSIS — J30.2 SEASONAL ALLERGIES: ICD-10-CM

## 2024-03-04 DIAGNOSIS — H40.9 GLAUCOMA OF BOTH EYES, UNSPECIFIED GLAUCOMA TYPE: ICD-10-CM

## 2024-03-04 DIAGNOSIS — Z23 IMMUNIZATION DUE: ICD-10-CM

## 2024-03-04 LAB
ANION GAP SERPL CALC-SCNC: 12 MEQ/L
APPEARANCE UR: CLEAR
BACTERIA #/AREA URNS AUTO: ABNORMAL /HPF
BILIRUB UR QL STRIP.AUTO: NEGATIVE
BUN SERPL-MCNC: 18 MG/DL (ref 9.8–20.1)
CALCIUM SERPL-MCNC: 9.7 MG/DL (ref 8.4–10.2)
CHLORIDE SERPL-SCNC: 104 MMOL/L (ref 98–107)
CHOLEST SERPL-MCNC: 164 MG/DL
CHOLEST/HDLC SERPL: 3 {RATIO} (ref 0–5)
CO2 SERPL-SCNC: 27 MMOL/L (ref 22–29)
COLOR UR AUTO: COLORLESS
CREAT SERPL-MCNC: 0.68 MG/DL (ref 0.55–1.02)
CREAT UR-MCNC: 32.7 MG/DL (ref 45–106)
CREAT/UREA NIT SERPL: 26
EST. AVERAGE GLUCOSE BLD GHB EST-MCNC: 168.6 MG/DL
GFR SERPLBLD CREATININE-BSD FMLA CKD-EPI: >60 MLS/MIN/1.73/M2
GLUCOSE SERPL-MCNC: 139 MG/DL (ref 74–100)
GLUCOSE UR QL STRIP.AUTO: NORMAL
HBA1C MFR BLD: 7.5 %
HDLC SERPL-MCNC: 56 MG/DL (ref 35–60)
KETONES UR QL STRIP.AUTO: NEGATIVE
LDLC SERPL CALC-MCNC: 72 MG/DL (ref 50–140)
LEUKOCYTE ESTERASE UR QL STRIP.AUTO: 25
MICROALBUMIN UR-MCNC: 6.8 UG/ML
MICROALBUMIN/CREAT RATIO PNL UR: 20.8 MG/GM CR (ref 0–30)
MUCOUS THREADS URNS QL MICRO: ABNORMAL /LPF
NITRITE UR QL STRIP.AUTO: NEGATIVE
PH UR STRIP.AUTO: 5 [PH]
POTASSIUM SERPL-SCNC: 4.2 MMOL/L (ref 3.5–5.1)
PROT UR QL STRIP.AUTO: NEGATIVE
RBC #/AREA URNS AUTO: ABNORMAL /HPF
RBC UR QL AUTO: NEGATIVE
SODIUM SERPL-SCNC: 143 MMOL/L (ref 136–145)
SP GR UR STRIP.AUTO: 1.01 (ref 1–1.03)
SQUAMOUS #/AREA URNS LPF: ABNORMAL /HPF
TRIGL SERPL-MCNC: 180 MG/DL (ref 37–140)
UROBILINOGEN UR STRIP-ACNC: NORMAL
VLDLC SERPL CALC-MCNC: 36 MG/DL
WBC #/AREA URNS AUTO: ABNORMAL /HPF

## 2024-03-04 PROCEDURE — 99396 PREV VISIT EST AGE 40-64: CPT | Mod: ,,, | Performed by: FAMILY MEDICINE

## 2024-03-04 PROCEDURE — 80061 LIPID PANEL: CPT | Performed by: FAMILY MEDICINE

## 2024-03-04 PROCEDURE — 1160F RVW MEDS BY RX/DR IN RCRD: CPT | Mod: CPTII,,, | Performed by: FAMILY MEDICINE

## 2024-03-04 PROCEDURE — 80048 BASIC METABOLIC PNL TOTAL CA: CPT | Performed by: FAMILY MEDICINE

## 2024-03-04 PROCEDURE — 81001 URINALYSIS AUTO W/SCOPE: CPT | Performed by: FAMILY MEDICINE

## 2024-03-04 PROCEDURE — 1159F MED LIST DOCD IN RCRD: CPT | Mod: CPTII,,, | Performed by: FAMILY MEDICINE

## 2024-03-04 PROCEDURE — 83036 HEMOGLOBIN GLYCOSYLATED A1C: CPT | Performed by: FAMILY MEDICINE

## 2024-03-04 PROCEDURE — 36415 COLL VENOUS BLD VENIPUNCTURE: CPT | Performed by: FAMILY MEDICINE

## 2024-03-04 PROCEDURE — 3008F BODY MASS INDEX DOCD: CPT | Mod: CPTII,,, | Performed by: FAMILY MEDICINE

## 2024-03-04 PROCEDURE — 82043 UR ALBUMIN QUANTITATIVE: CPT | Performed by: FAMILY MEDICINE

## 2024-03-04 PROCEDURE — 3074F SYST BP LT 130 MM HG: CPT | Mod: CPTII,,, | Performed by: FAMILY MEDICINE

## 2024-03-04 PROCEDURE — 3078F DIAST BP <80 MM HG: CPT | Mod: CPTII,,, | Performed by: FAMILY MEDICINE

## 2024-03-04 RX ORDER — CYCLOBENZAPRINE HCL 5 MG
TABLET ORAL
Qty: 20 TABLET | Refills: 1 | Status: SHIPPED | OUTPATIENT
Start: 2024-03-04

## 2024-03-04 RX ORDER — SIMVASTATIN 40 MG/1
TABLET, FILM COATED ORAL
Qty: 90 TABLET | Refills: 3 | Status: SHIPPED | OUTPATIENT
Start: 2024-03-04

## 2024-03-04 RX ORDER — METFORMIN HYDROCHLORIDE 500 MG/1
TABLET ORAL
Qty: 270 TABLET | Refills: 3 | Status: SHIPPED | OUTPATIENT
Start: 2024-03-04

## 2024-03-04 RX ORDER — METOPROLOL SUCCINATE 50 MG/1
50 TABLET, EXTENDED RELEASE ORAL DAILY
Qty: 90 TABLET | Refills: 3 | Status: SHIPPED | OUTPATIENT
Start: 2024-03-04

## 2024-03-04 RX ORDER — ALPRAZOLAM 1 MG/1
TABLET ORAL
Qty: 90 TABLET | Refills: 1 | Status: SHIPPED | OUTPATIENT
Start: 2024-03-04

## 2024-03-04 RX ORDER — VALSARTAN AND HYDROCHLOROTHIAZIDE 320; 25 MG/1; MG/1
1 TABLET, FILM COATED ORAL DAILY
Qty: 90 TABLET | Refills: 3 | Status: SHIPPED | OUTPATIENT
Start: 2024-03-04 | End: 2025-03-04

## 2024-06-03 PROBLEM — Z00.00 ENCOUNTER FOR WELLNESS EXAMINATION IN ADULT: Status: RESOLVED | Noted: 2023-03-01 | Resolved: 2024-06-03

## 2024-09-10 ENCOUNTER — PATIENT OUTREACH (OUTPATIENT)
Facility: CLINIC | Age: 59
End: 2024-09-10
Payer: COMMERCIAL

## 2024-09-10 LAB
LEFT EYE DM RETINOPATHY: NEGATIVE
RIGHT EYE DM RETINOPATHY: NEGATIVE

## 2024-09-10 NOTE — PROGRESS NOTES
Health Maintenance Topic(s) Outreach Outcomes & Actions Taken:    Eye Exam - Outreach Outcomes & Actions Taken  : Diabetic Eye External Records Uploaded, Care Team & History Updated if Applicable       Additional Notes:  Diabetic Eye Exam 9/10/24

## 2024-09-22 PROBLEM — Z01.818 PREOPERATIVE CLEARANCE: Status: ACTIVE | Noted: 2024-09-22

## 2024-09-25 ENCOUNTER — OFFICE VISIT (OUTPATIENT)
Dept: PRIMARY CARE CLINIC | Facility: CLINIC | Age: 59
End: 2024-09-25
Payer: COMMERCIAL

## 2024-09-25 VITALS
WEIGHT: 145.5 LBS | TEMPERATURE: 100 F | DIASTOLIC BLOOD PRESSURE: 77 MMHG | BODY MASS INDEX: 29.33 KG/M2 | OXYGEN SATURATION: 97 % | SYSTOLIC BLOOD PRESSURE: 118 MMHG | HEIGHT: 59 IN | HEART RATE: 106 BPM

## 2024-09-25 DIAGNOSIS — I10 ESSENTIAL (PRIMARY) HYPERTENSION: ICD-10-CM

## 2024-09-25 DIAGNOSIS — Z01.818 PREOPERATIVE CLEARANCE: Primary | ICD-10-CM

## 2024-09-25 DIAGNOSIS — Z23 IMMUNIZATION DUE: ICD-10-CM

## 2024-09-25 PROCEDURE — 3061F NEG MICROALBUMINURIA REV: CPT | Mod: CPTII,,, | Performed by: FAMILY MEDICINE

## 2024-09-25 PROCEDURE — 2023F DILAT RTA XM W/O RTNOPTHY: CPT | Mod: CPTII,,, | Performed by: FAMILY MEDICINE

## 2024-09-25 PROCEDURE — 1160F RVW MEDS BY RX/DR IN RCRD: CPT | Mod: CPTII,,, | Performed by: FAMILY MEDICINE

## 2024-09-25 PROCEDURE — 3066F NEPHROPATHY DOC TX: CPT | Mod: CPTII,,, | Performed by: FAMILY MEDICINE

## 2024-09-25 PROCEDURE — 3008F BODY MASS INDEX DOCD: CPT | Mod: CPTII,,, | Performed by: FAMILY MEDICINE

## 2024-09-25 PROCEDURE — 3078F DIAST BP <80 MM HG: CPT | Mod: CPTII,,, | Performed by: FAMILY MEDICINE

## 2024-09-25 PROCEDURE — 99214 OFFICE O/P EST MOD 30 MIN: CPT | Mod: ,,, | Performed by: FAMILY MEDICINE

## 2024-09-25 PROCEDURE — 3074F SYST BP LT 130 MM HG: CPT | Mod: CPTII,,, | Performed by: FAMILY MEDICINE

## 2024-09-25 PROCEDURE — 3051F HG A1C>EQUAL 7.0%<8.0%: CPT | Mod: CPTII,,, | Performed by: FAMILY MEDICINE

## 2024-09-25 PROCEDURE — 1159F MED LIST DOCD IN RCRD: CPT | Mod: CPTII,,, | Performed by: FAMILY MEDICINE

## 2024-09-25 NOTE — ASSESSMENT & PLAN NOTE
Patient presents for preoperative clearance for left cataract surgery on 10/02/2024.  She will then have right eye done.  See form.    Patient is cleared for surgery.

## 2024-10-02 ENCOUNTER — ANESTHESIA EVENT (OUTPATIENT)
Facility: HOSPITAL | Age: 59
End: 2024-10-02
Payer: COMMERCIAL

## 2024-10-02 ENCOUNTER — ANESTHESIA (OUTPATIENT)
Facility: HOSPITAL | Age: 59
End: 2024-10-02
Payer: COMMERCIAL

## 2024-10-02 ENCOUNTER — HOSPITAL ENCOUNTER (OUTPATIENT)
Facility: HOSPITAL | Age: 59
Discharge: HOME | End: 2024-10-02
Attending: OPHTHALMOLOGY | Admitting: OPHTHALMOLOGY
Payer: COMMERCIAL

## 2024-10-02 VITALS
OXYGEN SATURATION: 99 % | WEIGHT: 140 LBS | HEIGHT: 60 IN | RESPIRATION RATE: 17 BRPM | BODY MASS INDEX: 27.48 KG/M2 | TEMPERATURE: 99 F | SYSTOLIC BLOOD PRESSURE: 101 MMHG | DIASTOLIC BLOOD PRESSURE: 67 MMHG | HEART RATE: 85 BPM

## 2024-10-02 DIAGNOSIS — H26.9 CATARACT: ICD-10-CM

## 2024-10-02 PROBLEM — H25.12 AGE-RELATED NUCLEAR CATARACT OF LEFT EYE: Status: ACTIVE | Noted: 2024-10-02

## 2024-10-02 LAB
POCT GLUCOSE: 135 MG/DL (ref 70–110)
POCT GLUCOSE: 143 MG/DL (ref 70–110)

## 2024-10-02 PROCEDURE — 25000003 PHARM REV CODE 250

## 2024-10-02 PROCEDURE — 63600175 PHARM REV CODE 636 W HCPCS: Performed by: OPHTHALMOLOGY

## 2024-10-02 PROCEDURE — 27201423 OPTIME MED/SURG SUP & DEVICES STERILE SUPPLY: Performed by: OPHTHALMOLOGY

## 2024-10-02 PROCEDURE — 37000008 HC ANESTHESIA 1ST 15 MINUTES: Performed by: OPHTHALMOLOGY

## 2024-10-02 PROCEDURE — 63600175 PHARM REV CODE 636 W HCPCS: Performed by: NURSE ANESTHETIST, CERTIFIED REGISTERED

## 2024-10-02 PROCEDURE — 25000003 PHARM REV CODE 250: Performed by: OPHTHALMOLOGY

## 2024-10-02 PROCEDURE — 36000707: Performed by: OPHTHALMOLOGY

## 2024-10-02 PROCEDURE — V2632 POST CHMBR INTRAOCULAR LENS: HCPCS | Performed by: OPHTHALMOLOGY

## 2024-10-02 PROCEDURE — 37000009 HC ANESTHESIA EA ADD 15 MINS: Performed by: OPHTHALMOLOGY

## 2024-10-02 PROCEDURE — 36000706: Performed by: OPHTHALMOLOGY

## 2024-10-02 PROCEDURE — 71000015 HC POSTOP RECOV 1ST HR: Performed by: OPHTHALMOLOGY

## 2024-10-02 DEVICE — TECNIS 1-PC CLEAR MONO 6.0MM 14.5D
Type: IMPLANTABLE DEVICE | Site: EYE | Status: FUNCTIONAL
Brand: TECNIS IOL

## 2024-10-02 RX ORDER — BUPIVACAINE HYDROCHLORIDE 7.5 MG/ML
INJECTION, SOLUTION EPIDURAL; RETROBULBAR
Status: DISCONTINUED | OUTPATIENT
Start: 2024-10-02 | End: 2024-10-02 | Stop reason: HOSPADM

## 2024-10-02 RX ORDER — EPINEPHRINE 1 MG/ML
INJECTION, SOLUTION, CONCENTRATE INTRAVENOUS
Status: DISCONTINUED | OUTPATIENT
Start: 2024-10-02 | End: 2024-10-02 | Stop reason: HOSPADM

## 2024-10-02 RX ORDER — LIDOCAINE HYDROCHLORIDE 10 MG/ML
INJECTION, SOLUTION EPIDURAL; INFILTRATION; INTRACAUDAL; PERINEURAL
Status: DISCONTINUED | OUTPATIENT
Start: 2024-10-02 | End: 2024-10-02 | Stop reason: HOSPADM

## 2024-10-02 RX ORDER — MIDAZOLAM HYDROCHLORIDE 1 MG/ML
INJECTION INTRAMUSCULAR; INTRAVENOUS
Status: DISCONTINUED | OUTPATIENT
Start: 2024-10-02 | End: 2024-10-02

## 2024-10-02 RX ORDER — MEPERIDINE HYDROCHLORIDE 25 MG/ML
12.5 INJECTION INTRAMUSCULAR; INTRAVENOUS; SUBCUTANEOUS EVERY 10 MIN PRN
OUTPATIENT
Start: 2024-10-02 | End: 2024-10-03

## 2024-10-02 RX ORDER — IPRATROPIUM BROMIDE AND ALBUTEROL SULFATE 2.5; .5 MG/3ML; MG/3ML
3 SOLUTION RESPIRATORY (INHALATION)
OUTPATIENT
Start: 2024-10-02

## 2024-10-02 RX ORDER — MIDAZOLAM HYDROCHLORIDE 2 MG/2ML
2 INJECTION, SOLUTION INTRAMUSCULAR; INTRAVENOUS ONCE AS NEEDED
OUTPATIENT
Start: 2024-10-02 | End: 2036-02-29

## 2024-10-02 RX ORDER — BUPIVACAINE HYDROCHLORIDE 7.5 MG/ML
INJECTION, SOLUTION EPIDURAL; RETROBULBAR
Status: DISCONTINUED
Start: 2024-10-02 | End: 2024-10-02 | Stop reason: HOSPADM

## 2024-10-02 RX ORDER — PROCHLORPERAZINE EDISYLATE 5 MG/ML
5 INJECTION INTRAMUSCULAR; INTRAVENOUS EVERY 30 MIN PRN
OUTPATIENT
Start: 2024-10-02

## 2024-10-02 RX ORDER — POVIDONE-IODINE 5 %
SOLUTION, NON-ORAL OPHTHALMIC (EYE)
Status: DISCONTINUED | OUTPATIENT
Start: 2024-10-02 | End: 2024-10-02 | Stop reason: HOSPADM

## 2024-10-02 RX ORDER — TROPICAMIDE 10 MG/ML
1 SOLUTION/ DROPS OPHTHALMIC
Status: COMPLETED | OUTPATIENT
Start: 2024-10-02 | End: 2024-10-02

## 2024-10-02 RX ORDER — PHENYLEPHRINE HYDROCHLORIDE 100 MG/ML
1 SOLUTION/ DROPS OPHTHALMIC
Status: ACTIVE | OUTPATIENT
Start: 2024-10-02 | End: 2024-10-02

## 2024-10-02 RX ORDER — LIDOCAINE HYDROCHLORIDE 10 MG/ML
1 INJECTION, SOLUTION EPIDURAL; INFILTRATION; INTRACAUDAL; PERINEURAL ONCE
OUTPATIENT
Start: 2024-10-02 | End: 2024-10-02

## 2024-10-02 RX ORDER — ONDANSETRON 4 MG/1
8 TABLET, ORALLY DISINTEGRATING ORAL EVERY 6 HOURS PRN
OUTPATIENT
Start: 2024-10-02

## 2024-10-02 RX ORDER — LIDOCAINE HYDROCHLORIDE 10 MG/ML
INJECTION, SOLUTION EPIDURAL; INFILTRATION; INTRACAUDAL; PERINEURAL
Status: DISCONTINUED
Start: 2024-10-02 | End: 2024-10-02 | Stop reason: HOSPADM

## 2024-10-02 RX ORDER — ONDANSETRON HYDROCHLORIDE 2 MG/ML
4 INJECTION, SOLUTION INTRAVENOUS DAILY PRN
OUTPATIENT
Start: 2024-10-02

## 2024-10-02 RX ORDER — EPINEPHRINE 1 MG/ML
INJECTION, SOLUTION, CONCENTRATE INTRAVENOUS
Status: DISCONTINUED
Start: 2024-10-02 | End: 2024-10-02 | Stop reason: HOSPADM

## 2024-10-02 RX ORDER — POVIDONE-IODINE 5 %
SOLUTION, NON-ORAL OPHTHALMIC (EYE)
Status: DISCONTINUED
Start: 2024-10-02 | End: 2024-10-02 | Stop reason: HOSPADM

## 2024-10-02 RX ORDER — BUPIVACAINE HYDROCHLORIDE 7.5 MG/ML
0.1 INJECTION, SOLUTION EPIDURAL; RETROBULBAR
Status: COMPLETED | OUTPATIENT
Start: 2024-10-02 | End: 2024-10-02

## 2024-10-02 RX ORDER — CYCLOPENTOLATE HYDROCHLORIDE 10 MG/ML
1 SOLUTION/ DROPS OPHTHALMIC EVERY 5 MIN PRN
Status: DISCONTINUED | OUTPATIENT
Start: 2024-10-02 | End: 2024-10-02 | Stop reason: HOSPADM

## 2024-10-02 RX ORDER — GLUCAGON 1 MG
1 KIT INJECTION
OUTPATIENT
Start: 2024-10-02

## 2024-10-02 RX ADMIN — TROPICAMIDE 1 DROP: 10 SOLUTION/ DROPS OPHTHALMIC at 08:10

## 2024-10-02 RX ADMIN — PHENYLEPHRINE HYDROCHLORIDE 1 DROP: 100 SOLUTION/ DROPS OPHTHALMIC at 08:10

## 2024-10-02 RX ADMIN — BUPIVACAINE HYDROCHLORIDE 0.75 MG: 7.5 INJECTION, SOLUTION EPIDURAL; RETROBULBAR at 08:10

## 2024-10-02 RX ADMIN — BUPIVACAINE HYDROCHLORIDE 0.75 MG: 7.5 INJECTION, SOLUTION EPIDURAL; RETROBULBAR at 09:10

## 2024-10-02 RX ADMIN — MIDAZOLAM 2 MG: 1 INJECTION INTRAMUSCULAR; INTRAVENOUS at 10:10

## 2024-10-02 RX ADMIN — TROPICAMIDE 1 DROP: 10 SOLUTION/ DROPS OPHTHALMIC at 09:10

## 2024-10-02 NOTE — OP NOTE
Date of Procedure: 10/2/2024     Procedure: Procedure(s) (LRB):  PHACOEMULSIFICATION, CATARACT, WITH IOL INSERTION  ////left eye (Left)     Surgeons and Role:     * Kvng Luo II, MD - Primary    Pre-Operative Diagnosis: Nuclear sclerotic cataract of left eye [H25.12]    Post-Operative Diagnosis: Post-Op Diagnosis Codes:     * Nuclear sclerotic cataract of left eye [H25.12]    Anesthesia: Monitor Anesthesia Care    Operative Findings (including complications, if any): None    Description of Technical Procedures:     Dilating drops were given in the holding area. The patient was brought into the surgical suite, identified and correct eye confirmed. Topical anesthesia was applied. The eye was then prepped and draped in a sterile fashion. A supersharp blade was used to make a paracentesis at the 2 o'clock position. 1/2 CC of 2% Lidocaine + 1/2 CC BSS was injected into AC thru cannula. Viscoelastic was placed in the anterior chamber. A clear corneal incision was made at the 3 o'clock position with a keratome blade. Next, a cystotome and utrata forceps were used to make a 360 degree capsulorrhexis. BSS thru a cannula was used to hydro dissect and rotate the lens material from the capsule. The phaco handpiece was placed into the anterior chamber and the lens was removed in a divide an conquer fashion. The remaining cortex was removed with the I & A handpiece. Viscoelastic was placed into the capsular bag, which remained clear and intact. An IOL was placed in the bag and rotated into position. The remaining viscoelastic was removed with the I&A handpiece. The incision was hydrated with BSS and checked for leakage. No leakage was found. The drapes were removed and antibiotic drops were placed into the eye. The patient tolerated the procedure well and was moved back to the holding room. Sunglasses and instructions were given to the patient and family. The patient will follow-up at my office tomorrow.    EFX:5.6  Lens:  14.5 ZCB00

## 2024-10-02 NOTE — ANESTHESIA POSTPROCEDURE EVALUATION
Anesthesia Post Evaluation    Patient: Farideh Mercado    Procedure(s) Performed: Procedure(s) (LRB):  PHACOEMULSIFICATION, CATARACT, WITH IOL INSERTION  ////left eye (Left)    Final Anesthesia Type: MAC      Patient location during evaluation: OPS  Patient participation: Yes- Able to Participate  Level of consciousness: awake and alert and oriented  Post-procedure vital signs: reviewed and stable  Airway patency: patent    PONV status at discharge: No PONV  Anesthetic complications: no      Cardiovascular status: blood pressure returned to baseline  Respiratory status: unassisted  Hydration status: euvolemic  Follow-up not needed.              Vitals Value Taken Time   /72 10/02/24 0849   Temp 37 °C (98.6 °F) 10/02/24 0847   Pulse 100 10/02/24 0847   Resp 16 10/02/24 1022   SpO2 96 % 10/02/24 0847         No case tracking events are documented in the log.      Pain/Carolyn Score: No data recorded

## 2024-10-02 NOTE — ANESTHESIA PREPROCEDURE EVALUATION
"                                                                                                             10/02/2024  Farideh Mercado is a 59 y.o., female presents as an outpatient for left cataract extraction.    Last 3 sets of Vitals        9/25/2024     7:52 AM 9/30/2024    12:14 PM 10/2/2024     8:47 AM   Vitals - 1 value per visit   SYSTOLIC 118  111   DIASTOLIC 77  72   Pulse 106  100   Temp 37.6 °C (99.6 °F)  37 °C (98.6 °F)   SPO2 97 %  96 %   Weight (lb) 145.5 140    Weight (kg) 65.998 63.504    Height 4' 11" (1.499 m) 5' (1.524 m)    BMI (Calculated) 29.4 27.3    Pain Score Zero       BMP  Lab Results   Component Value Date     03/04/2024    K 4.2 03/04/2024     03/04/2024    CO2 27 03/04/2024    BUN 18.0 03/04/2024    CREATININE 0.68 03/04/2024    CALCIUM 9.7 03/04/2024    EGFRNONAA >60 02/11/2019    Pre-op Assessment    I have reviewed the Patient Summary Reports.    I have reviewed the NPO Status.   I have reviewed the Medications.     Review of Systems  Anesthesia Hx:               Denies Personal Hx of Anesthesia complications.                    Social:  Non-Smoker       Cardiovascular:     Hypertension                Functional Capacity good / => 4 METS                         Endocrine:  Diabetes, type 2               Physical Exam  General: Well nourished, Cooperative, Alert and Oriented    Airway:  Mallampati: II   Mouth Opening: Normal  TM Distance: Normal  Tongue: Normal  Neck ROM: Normal ROM    Dental:  Intact    Chest/Lungs:  Clear to auscultation, Normal Respiratory Rate    Heart:  Rate: Normal  Rhythm: Regular Rhythm        Anesthesia Plan  Type of Anesthesia, risks & benefits discussed:    Anesthesia Type: Gen Natural Airway  Intra-op Monitoring Plan: Standard ASA Monitors  Induction:  IV  Informed Consent: Informed consent signed with the Patient and all parties understand the risks and agree with anesthesia plan.  All questions answered.   ASA Score: 2  Day of " Surgery Review of History & Physical: H&P Update referred to the surgeon/provider.    Ready For Surgery From Anesthesia Perspective.     .

## 2024-10-02 NOTE — TRANSFER OF CARE
Anesthesia Transfer of Care Note    Patient: Farideh Mercado    Procedure(s) Performed: Procedure(s) (LRB):  PHACOEMULSIFICATION, CATARACT, WITH IOL INSERTION  ////left eye (Left)    Patient location: OPS    Anesthesia Type: MAC    Transport from OR: Transported from OR on room air with adequate spontaneous ventilation    Post pain: adequate analgesia    Post assessment: no apparent anesthetic complications    Post vital signs: stable    Level of consciousness: awake, alert and oriented    Complications: none    Transfer of care protocol was followed      Last vitals: Visit Vitals  /72   Pulse 100   Temp 37 °C (98.6 °F) (Oral)   Ht 5' (1.524 m)   Wt 63.5 kg (140 lb)   SpO2 96%   Breastfeeding No   BMI 27.34 kg/m²

## 2024-10-02 NOTE — DISCHARGE SUMMARY
[facility] 2nd Floor  Discharge Note  Short Stay    Procedure(s) (LRB):  PHACOEMULSIFICATION, CATARACT, WITH IOL INSERTION  ////left eye (Left)      OUTCOME: Patient tolerated treatment/procedure well without complication and is now ready for discharge.    DISPOSITION: Home or Self Care    FINAL DIAGNOSIS:  Pseudophakia Z96.1    FOLLOWUP: Tomorrow @ Dr. Luo's Office    DISCHARGE INSTRUCTIONS:   To Recovery and when criteria met discharge to home  Surgery: Refer to operative note  IV: D/C per anesthesia   Diet: Advance as tolerated  Activity: light duty, no outside work, no lifting, sleep with shield  Meds: Follow post-op instruction sheet provided (antibiotic, steroid, & anti-inflammatory gtts per Dr. Luo)   Resume all medicine from home

## 2024-10-15 DIAGNOSIS — F41.9 ANXIETY: Primary | ICD-10-CM

## 2024-10-15 RX ORDER — ALPRAZOLAM 1 MG/1
TABLET ORAL
Qty: 90 TABLET | Refills: 1 | Status: SHIPPED | OUTPATIENT
Start: 2024-10-15

## 2024-11-20 ENCOUNTER — ANESTHESIA EVENT (OUTPATIENT)
Facility: HOSPITAL | Age: 59
End: 2024-11-20
Payer: COMMERCIAL

## 2024-11-20 ENCOUNTER — HOSPITAL ENCOUNTER (OUTPATIENT)
Facility: HOSPITAL | Age: 59
Discharge: HOME | End: 2024-11-20
Attending: OPHTHALMOLOGY | Admitting: OPHTHALMOLOGY
Payer: COMMERCIAL

## 2024-11-20 ENCOUNTER — ANESTHESIA (OUTPATIENT)
Facility: HOSPITAL | Age: 59
End: 2024-11-20
Payer: COMMERCIAL

## 2024-11-20 VITALS
OXYGEN SATURATION: 94 % | RESPIRATION RATE: 16 BRPM | TEMPERATURE: 98 F | HEART RATE: 89 BPM | WEIGHT: 140 LBS | HEIGHT: 60 IN | DIASTOLIC BLOOD PRESSURE: 60 MMHG | SYSTOLIC BLOOD PRESSURE: 91 MMHG | BODY MASS INDEX: 27.48 KG/M2

## 2024-11-20 DIAGNOSIS — H26.9 CATARACT: ICD-10-CM

## 2024-11-20 PROBLEM — H25.12 AGE-RELATED NUCLEAR CATARACT OF LEFT EYE: Status: RESOLVED | Noted: 2024-10-02 | Resolved: 2024-11-20

## 2024-11-20 PROBLEM — H25.11 AGE-RELATED NUCLEAR CATARACT OF RIGHT EYE: Status: ACTIVE | Noted: 2024-11-20

## 2024-11-20 LAB
GLUCOSE SERPL-MCNC: 120 MG/DL (ref 70–110)
POCT GLUCOSE: 120 MG/DL (ref 70–110)
POCT GLUCOSE: 137 MG/DL (ref 70–110)

## 2024-11-20 PROCEDURE — 36000706: Performed by: OPHTHALMOLOGY

## 2024-11-20 PROCEDURE — 25000003 PHARM REV CODE 250

## 2024-11-20 PROCEDURE — 36000707: Performed by: OPHTHALMOLOGY

## 2024-11-20 PROCEDURE — 63600175 PHARM REV CODE 636 W HCPCS: Performed by: NURSE ANESTHETIST, CERTIFIED REGISTERED

## 2024-11-20 PROCEDURE — 25000003 PHARM REV CODE 250: Performed by: OPHTHALMOLOGY

## 2024-11-20 PROCEDURE — 37000009 HC ANESTHESIA EA ADD 15 MINS: Performed by: OPHTHALMOLOGY

## 2024-11-20 PROCEDURE — 27201423 OPTIME MED/SURG SUP & DEVICES STERILE SUPPLY: Performed by: OPHTHALMOLOGY

## 2024-11-20 PROCEDURE — 71000015 HC POSTOP RECOV 1ST HR: Performed by: OPHTHALMOLOGY

## 2024-11-20 PROCEDURE — V2632 POST CHMBR INTRAOCULAR LENS: HCPCS | Performed by: OPHTHALMOLOGY

## 2024-11-20 PROCEDURE — 63600175 PHARM REV CODE 636 W HCPCS: Mod: JZ | Performed by: OPHTHALMOLOGY

## 2024-11-20 PROCEDURE — 37000008 HC ANESTHESIA 1ST 15 MINUTES: Performed by: OPHTHALMOLOGY

## 2024-11-20 DEVICE — TECNIS 1-PC CLEAR MONO 6.0MM 14.0D
Type: IMPLANTABLE DEVICE | Site: EYE | Status: FUNCTIONAL
Brand: TECNIS IOL

## 2024-11-20 RX ORDER — LIDOCAINE HYDROCHLORIDE 10 MG/ML
INJECTION, SOLUTION EPIDURAL; INFILTRATION; INTRACAUDAL; PERINEURAL
Status: DISCONTINUED | OUTPATIENT
Start: 2024-11-20 | End: 2024-11-20 | Stop reason: HOSPADM

## 2024-11-20 RX ORDER — ONDANSETRON HYDROCHLORIDE 2 MG/ML
INJECTION, SOLUTION INTRAVENOUS
Status: DISCONTINUED | OUTPATIENT
Start: 2024-11-20 | End: 2024-11-20

## 2024-11-20 RX ORDER — FENTANYL CITRATE 50 UG/ML
INJECTION, SOLUTION INTRAMUSCULAR; INTRAVENOUS
Status: DISCONTINUED | OUTPATIENT
Start: 2024-11-20 | End: 2024-11-20

## 2024-11-20 RX ORDER — EPINEPHRINE 1 MG/ML
INJECTION, SOLUTION, CONCENTRATE INTRAVENOUS
Status: DISCONTINUED | OUTPATIENT
Start: 2024-11-20 | End: 2024-11-20 | Stop reason: HOSPADM

## 2024-11-20 RX ORDER — BUPIVACAINE HYDROCHLORIDE 7.5 MG/ML
0.1 INJECTION, SOLUTION EPIDURAL; RETROBULBAR
Status: COMPLETED | OUTPATIENT
Start: 2024-11-20 | End: 2024-11-20

## 2024-11-20 RX ORDER — TROPICAMIDE 10 MG/ML
1 SOLUTION/ DROPS OPHTHALMIC
Status: COMPLETED | OUTPATIENT
Start: 2024-11-20 | End: 2024-11-20

## 2024-11-20 RX ORDER — PHENYLEPHRINE HYDROCHLORIDE 100 MG/ML
1 SOLUTION/ DROPS OPHTHALMIC
Status: COMPLETED | OUTPATIENT
Start: 2024-11-20 | End: 2024-11-20

## 2024-11-20 RX ORDER — BUPIVACAINE HYDROCHLORIDE 7.5 MG/ML
INJECTION, SOLUTION EPIDURAL; RETROBULBAR
Status: DISCONTINUED | OUTPATIENT
Start: 2024-11-20 | End: 2024-11-20 | Stop reason: HOSPADM

## 2024-11-20 RX ORDER — MIDAZOLAM HYDROCHLORIDE 1 MG/ML
INJECTION INTRAMUSCULAR; INTRAVENOUS
Status: DISCONTINUED | OUTPATIENT
Start: 2024-11-20 | End: 2024-11-20

## 2024-11-20 RX ADMIN — BUPIVACAINE HYDROCHLORIDE 0.75 MG: 7.5 INJECTION, SOLUTION EPIDURAL; RETROBULBAR at 10:11

## 2024-11-20 RX ADMIN — PHENYLEPHRINE HYDROCHLORIDE 1 DROP: 100 SOLUTION/ DROPS OPHTHALMIC at 10:11

## 2024-11-20 RX ADMIN — ONDANSETRON HYDROCHLORIDE 4 MG: 2 SOLUTION INTRAMUSCULAR; INTRAVENOUS at 11:11

## 2024-11-20 RX ADMIN — TROPICAMIDE 1 DROP: 10 SOLUTION/ DROPS OPHTHALMIC at 10:11

## 2024-11-20 RX ADMIN — FENTANYL CITRATE 100 MCG: 50 INJECTION INTRAMUSCULAR; INTRAVENOUS at 11:11

## 2024-11-20 RX ADMIN — MIDAZOLAM HYDROCHLORIDE 2 MG: 1 INJECTION, SOLUTION INTRAMUSCULAR; INTRAVENOUS at 11:11

## 2024-11-20 NOTE — OP NOTE
Date of Procedure: 11/20/2024     Procedure: Procedure(s) (LRB):  PHACOEMULSIFICATION, CATARACT, WITH IOL INSERTION  ////right eye; Catalys (Right)     Surgeons and Role:     * Kvng Luo II, MD - Primary    Pre-Operative Diagnosis: Nuclear sclerotic cataract of right eye [H25.11]    Post-Operative Diagnosis: Post-Op Diagnosis Codes:     * Nuclear sclerotic cataract of right eye [H25.11]    Anesthesia: Monitor Anesthesia Care    Operative Findings (including complications, if any): None    Description of Technical Procedures:     Dilating drops were given in the holding area. Topical anesthesia was applied, axis marked with patient sitting upright and the Catalys laser was used to perform the capsulorhexis, LRI, and lens fragmentation. The patient was brought into the surgical suite, identified and correct eye confirmed. Topical anesthesia was applied. The eye was then prepped and draped in a sterile fashion. A supersharp blade was used to make a paracentesis at the 10 o'clock position. 1/2 CC of 2% Lidocaine + 1/2 CC BSS was injected into AC thru cannula. Viscoelastic was placed in the anterior chamber. A clear corneal incision was made at the 9 o'clock position with a keratome blade. Next, utrata forceps were used to remove the capsulorrhexis. BSS thru a cannula was used to hydro dissect and rotate the lens material from the capsule. The phaco handpiece was placed into the anterior chamber and the lens was removed in a divide an conquer fashion. The remaining cortex was removed with the I & A handpiece. Viscoelastic was placed into the capsular bag, which remained clear and intact. An IOL was placed in the bag and rotated into position. The remaining viscoelastic was removed with the I&A handpiece. The incision was hydrated with BSS and checked for leakage. No leakage was found. The drapes were removed and antibiotic drops were placed into the eye. The patient tolerated the procedure well and was moved back to  the holding room. Sunglasses and instructions were given to the patient and family. The patient will follow-up at my office tomorrow.    EFX:3.8  Lens: 14.0 ZCB00  LRI: TWO ARCS @ 52/232 ANTERIOR PENETRATING

## 2024-11-20 NOTE — ANESTHESIA PREPROCEDURE EVALUATION
2024  Faridhe Mercado is a 59 y.o., female.  Diagnosis: Nuclear sclerotic cataract of right eye [H25.11]   Pre-op diagnosis: Nuclear sclerotic cataract of right eye [H25.11]       The pt. comes to  \[facility]\"" for the noted procedure under IV Sedation and  +/- Topical Ophthalmic gtts.  Case: [id number] Date/Time: 24 1006   Procedure: PHACOEMULSIFICATION, CATARACT, WITH IOL INSERTION  ////right eye; Catalys (Right)   Anesthesia type: Monitor Anesthesia Care         PMHx:  Other Medical History   Hypercholesteremia Essential (primary) hypertension   Infiltrating ductal carcinoma of left breast DM (diabetes mellitus)   Problem List  Current as of 24 1104  Age-related nuclear cataract of left eye Chronic pain of left knee   Essential (primary) hypertension Glaucoma of both eyes   Hypercholesteremia Immunization due   Infiltrating ductal carcinoma of left breast Preoperative clearance   Primary insomnia Screening declined by patient   Seasonal allergies Type 2 diabetes mellitus without complication, without long-term current use of insulin     PSHx:  Surgical History  PHACOEMULSIFICATION, CATARACT, WITH IOL INSERTION  BREAST LUMPECTOMY COLONOSCOPY    SECTION  SECTION         Vital signs:  BP: 111/71 Pulse: 97   Resp: SpO2: 96   Temp: 36.6 °C (97.9 °F)   Height: 5' (1.524 m) (24) Weight: 63.5 kg (140 lb) (24)   BMI: 27.3 IBW: 45.5 kg (100 lb 4.9 oz)   Last edited 24 1006 by WW  Currently displaying vitals information from multiple entries within 15 minutes of most recent vitals.      Lab Data:  N/A    EKG:  N/A      Pre-op Assessment    I have reviewed the Patient Summary Reports.     I have reviewed the Nursing Notes. I have reviewed the NPO Status.   I have reviewed the Medications.     Review of Systems  Anesthesia Hx:  No problems with previous Anesthesia                 Social:  Non-Smoker       Hematology/Oncology:  Hematology Normal   Oncology Normal                                   EENT/Dental:  EENT/Dental Normal           Cardiovascular:  Exercise tolerance: good   Hypertension                  Functional Capacity good / => 4 METS                   Hypertension         Pulmonary:  Pulmonary Normal                       Renal/:  Renal/ Normal                 Hepatic/GI:  Hepatic/GI Normal                    Musculoskeletal:  Musculoskeletal Normal                Neurological:  Neurology Normal                                      Endocrine:  Diabetes    Diabetes                      Dermatological:  Skin Normal    Psych:  Psychiatric Normal                       Anesthesia Plan  Type of Anesthesia, risks & benefits discussed:    Anesthesia Type: Gen Natural Airway, MAC  Intra-op Monitoring Plan: Standard ASA Monitors  Post Op Pain Control Plan: IV/PO Opioids PRN  Induction:  IV  ASA Score: 2  Day of Surgery Review of History & Physical: H&P Update referred to the surgeon/provider.  Anesthesia Plan Notes: IV Sedation with Topical Ophthalmic gtts    Ready For Surgery From Anesthesia Perspective.     .

## 2024-11-20 NOTE — TRANSFER OF CARE
Anesthesia Transfer of Care Note    Patient: Farideh Mercado    Procedure(s) Performed: Procedure(s) (LRB):  PHACOEMULSIFICATION, CATARACT, WITH IOL INSERTION  ////right eye; Catalys (Right)    Patient location: OPS    Anesthesia Type: general and MAC    Transport from OR: Transported from OR on room air with adequate spontaneous ventilation    Post pain: adequate analgesia    Post assessment: no apparent anesthetic complications    Post vital signs: stable    Level of consciousness: responds to stimulation    Nausea/Vomiting: no nausea/vomiting    Complications: none    Transfer of care protocol was followed      Last vitals: Visit Vitals  BP 94/72   Pulse 81   Temp 36.6 °C (97.9 °F) (Oral)   Resp 16   Ht 5' (1.524 m)   Wt 63.5 kg (140 lb)   SpO2 98%   Breastfeeding No   BMI 27.34 kg/m²

## 2024-11-20 NOTE — DISCHARGE SUMMARY
[facility] - Periop Services 2nd Floor  Discharge Note  Short Stay    Procedure(s) (LRB):  PHACOEMULSIFICATION, CATARACT, WITH IOL INSERTION  ////right eye; Catalys (Right)      OUTCOME: Patient tolerated treatment/procedure well without complication and is now ready for discharge.    DISPOSITION: Home or Self Care    FINAL DIAGNOSIS:  Pseudophakia Z96.1    FOLLOWUP: Tomorrow @ Dr. Luo's Office    DISCHARGE INSTRUCTIONS:   To Recovery and when criteria met discharge to home  Surgery: Refer to operative note  IV: D/C per anesthesia   Diet: Advance as tolerated  Activity: light duty, no outside work, no lifting, sleep with shield  Meds: Follow post-op instruction sheet provided (antibiotic, steroid, & anti-inflammatory gtts per Dr. Luo)   Resume all medicine from home

## 2024-11-20 NOTE — ANESTHESIA POSTPROCEDURE EVALUATION
Anesthesia Post Evaluation    Patient: Farideh Mercado    Procedure(s) Performed: Procedure(s) (LRB):  PHACOEMULSIFICATION, CATARACT, WITH IOL INSERTION  ////right eye; Catalys (Right)    Final Anesthesia Type: MAC      Patient location during evaluation: OPS  Patient participation: Yes- Able to Participate  Level of consciousness: awake and alert, awake and oriented  Post-procedure vital signs: reviewed and stable  Pain management: adequate  Airway patency: patent    PONV status at discharge: No PONV  Anesthetic complications: no      Cardiovascular status: blood pressure returned to baseline  Respiratory status: unassisted, room air and spontaneous ventilation  Hydration status: euvolemic  Follow-up not needed.              Vitals Value Taken Time   /67    Temp 36.6 °C (97.9 °F)    Pulse 77    Resp 12    SpO2 96 %          No case tracking events are documented in the log.      Pain/Carolyn Score: No data recorded

## 2025-02-21 DIAGNOSIS — Z00.00 WELLNESS EXAMINATION: Primary | ICD-10-CM

## 2025-02-26 NOTE — PROGRESS NOTES
..Annual Exam (Labs not done remains fasting /Labs done in December)       HISTORY OF PRESENT ILLNESS    This 60 y.o. female patient presents to the clinic today for a wellness CPX.  She has been feeling well.  She has been sleeping okay; she has problems staying asleep. She falls asleep well.  Her appetite has been good.  She is  with 2 daughters.  She is a  at [employer].  She does not smoke.  She has alcohol once or twice a week.  Colonoscopy 2023: Dr Wolfe; normal, follow up in 7 years.  Oncology: Dr Greer: 2024.  She does gardening almost daily. She walks 7000 steps a day.      The patient's Health Maintenance was reviewed and the following appears to be due at this time:   Health Maintenance Due   Topic Date Due    Hepatitis C Screening  Never done    HIV Screening  Never done    Shingles Vaccine (1 of 2) Never done    COVID-19 Vaccine (3 - Pfizer risk series) 2021    Pneumococcal Vaccines (Age 50+) (3 of 3 - PPSV23, PCV20 or PCV21) 2023    Influenza Vaccine (1) 2024    Hemoglobin A1c  2024    RSV Vaccine (Age 60+ and Pregnant patients) (1 - Risk 60-74 years 1-dose series) Never done    Diabetes Urine Screening  2025    Lipid Panel  2025       ..  Past Medical History:   Diagnosis Date    Cataract     Essential (primary) hypertension     Hypercholesteremia     Infiltrating ductal carcinoma of left breast           ..  Past Surgical History:   Procedure Laterality Date    BREAST LUMPECTOMY Left 2019     SECTION  2001     SECTION  1993    COLONOSCOPY  2018    EYE SURGERY  10/24    Cataract removal    PHACOEMULSIFICATION, CATARACT, WITH IOL INSERTION Left 10/02/2024    Procedure: PHACOEMULSIFICATION, CATARACT, WITH IOL INSERTION  ////left eye;  Surgeon: Kvng Luo II, MD;  Location: [address];  Service: Ophthalmology;  Laterality: Left;    PHACOEMULSIFICATION, CATARACT, WITH IOL INSERTION Right  11/20/2024    Procedure: PHACOEMULSIFICATION, CATARACT, WITH IOL INSERTION  ////right eye; Catalys;  Surgeon: Kvng Luo II, MD;  Location: [address];  Service: Ophthalmology;  Laterality: Right;          Current Outpatient Medications   Medication Instructions    ALPRAZolam (XANAX) 1 MG tablet TAKE 1 TABLET(1 MG) BY MOUTH EVERY NIGHT AS NEEDED FOR INSOMNIA OR ANXIETY    amLODIPine (NORVASC) 10 mg, Oral, Daily    brimonidine-timoloL (COMBIGAN) 0.2-0.5 % Drop INSTILL 1 DROP INTO RIGHT EYE TWICE DAILY TO LOWER INTRAOCULAR PRESSURE    cyclobenzaprine (FLEXERIL) 5 MG tablet Take 1 tablet once to twice a day as needed for muscle spasm.    latanoprost 0.005 % ophthalmic solution INSTILL 1 DROP INTO BOTH EYES EVERY NIGHT AT BEDTIME TO LOWER INTRAOCULAR PRESSURE    metFORMIN (GLUCOPHAGE) 500 MG tablet TAKE 1 TABLET BY MOUTH EVERY MORNING AND TAKE 2 TABLET BY MOUTH EVERY EVENING    metoprolol succinate (TOPROL-XL) 50 mg, Oral, Daily    simvastatin (ZOCOR) 40 MG tablet TAKE 1 TABLET BY MOUTH EVERY DAY AFTER THE EVENING MEAL    tamoxifen (NOLVADEX) 20 mg, Daily    timolol maleate 0.5% (TIMOPTIC) 0.5 % Drop INSTILL 1 DROP IN BOTH EYES TWICE DAILY TO LOWER INTRAOCULAR PRESSURE    valsartan-hydrochlorothiazide (DIOVAN-HCT) 320-25 mg per tablet 1 tablet, Oral, Daily         ..Social History[1]       ..  Family History   Problem Relation Name Age of Onset    Skin cancer Mother Lily Richter     Kidney disease Mother Lily Richter     Arthritis Mother Lily Richter     Diabetes Father Taran Richter     Heart disease Father Taran Richter     Stroke Father Taran Richter           ..  Review of patient's allergies indicates:   Allergen Reactions    Sulfa (sulfonamide antibiotics) Rash and Shortness Of Breath          ..  Immunization History   Administered Date(s) Administered    COVID-19, MRNA, LN-S, PF (Pfizer) (Purple Cap) 03/26/2021, 04/16/2021    Influenza - Trivalent - Afluria, Fluzone MDV 09/15/2013, 11/25/2014     Influenza - Trivalent - Fluarix, Flulaval, Fluzone, Afluria - PF 09/15/2013, 11/25/2014    Pneumococcal Conjugate - 13 Valent 06/29/2015    Pneumococcal Polysaccharide - 23 Valent 02/09/2018    Td (ADULT) 11/01/2004    Tdap 12/13/2012, 03/03/2023          REVIEW OF SYSTEMS:    GENERAL:   no unexplained wt gain/loss,  fever, fatigue, chills, night sweats or weakness  HEENT: no sore throat, ear pain, sinus pressure, nasal congestion, or rhinorrhea, + sneezing, itchy eyes, + seasonal allergies  VISION: no vision changes, glaucoma, + bilateral cataract surgery, August 2024; Dr Dial  CARDIAC: no chest pain, palpitations, dyspnea on exertion, orthopnea  RESPIRATORY: no cough, wheezing, sputum production, or SOB  GI: no abdominal pain, n&v, no heartburn, constipation, diarrhea,  blood in stool or  (--)family history of colon cancer    : no dysuria, hematuria, frequency,  urgency, incontinence,  vaginal discharge,  abnormal vaginal bleeding  MUSC/SKEL:  no myalgia, weakness, edema, + arthralgias; fingers, left knee no joint effusion  SKIN:  No rash, hives, itching or sores  NEURO:  No headaches, numbness,  tingling, weakness, or dizziness  PSYCH:  No anxiety,  depression,  irritability,  suicidal ideation or hallucinations  ENDO:  No polyuria, polydipsia,  polyphagia  HEME:  No bruising,  lymphadenopathy, bleeding disorders, no anemia       PHYSICAL EXAM:    Visit Vitals  /81   Pulse 92   Temp 98.1 °F (36.7 °C)   Ht 5' (1.524 m)   Wt 66.5 kg (146 lb 8 oz)   SpO2 97%   BMI 28.61 kg/m²       GENERAL:  Well-developed well-nourished white female in NAD, alert and oriented x 3  SKIN:  no rash or abnormal appearing skin lesions  HEENT:  PERRLA, EOMI, mouth wnl, throat wnl, EAC and TM wnl bilaterally  NECK:  FROM, no lymphadenopathy, no thyroid abnormalities palpable  CHEST:  CTA bilaterally no wheezes, crackles or rubs  CARDIAC:  RRR, no murmurs audible  ABDOMEN:  Soft, nontender, nondistended, NBSx4, no rebound or  guarding, no HSM  EXTREMITIES:  no clubbing, cyanosis, or edema.  joints wnl. +2 DP/PT pulse bilaterally  NEURO:  no sensory or motor deficits noted. CN II-XII intact. Gait wnl.   Protective Sensation (w/ 10 gram monofilament):  Right: Intact  Left: Intact    Visual Inspection:  Normal -  Bilateral    Pedal Pulses:   Right: Present  Left: Present    Posterior Tibialis Pulses:   Right:Present  Left: Present           ASSESSMENT AND PLAN     1. Encounter for wellness examination in adult  Overview:  Patient presents for wellness examination.    She is been feeling well.    She sleeps well with alprazolam.    Her hypertension is well controlled.  She is physically active; she does yd work and walks regularly.    Last colonoscopy 2018:  Dr. Wolfe; polyps x2, she is due for follow-up this year.  Gyn: Dr. Karin Alvarez; last office visit August 2022.  Oncologist:  Dr. Browne; sorry in January; has a mammogram scheduled in July.  Eye doctor: Dr. Dial; sees him every 6 months.  Tdap administered.    A1c and microalbumin level pending.  Remainder labs performed by oncology twice yearly.    03/04/2024: Patient presents for wellness examination.    She has been feeling well.  Colonoscopy 06/30/2023: Dr Wolfe; normal, follow-up in 7 years.    Oncologist: Dr Browne who will be retiring.  She will be seeing a new oncologist.  History of breast cancer; has been in remission since 2019.    Last mammogram 07/05/2023.  Patient encouraged to remain physically active.    Patient encouraged to make healthy food choices and limit portions.  Patient encouraged to get shingles vaccine at her pharmacy.  Labs pending.    Assessment & Plan:  Patient presents for wellness examination.    She has been feeling well.  Colonoscopy 06/30/2023: Dr Wolfe; normal, follow-up in 7 years.    Oncologist: Dr Greer; December 2024.   History of breast cancer; has been in remission since 2019.  Patient has finished 5 years of  tamoxifen.  Patient encouraged to remain physically active.    Patient encouraged to make healthy food choices and limit portions  Patient declines pneumonia, RSV and Shingrix vaccines.  CMP and CBC from December 2024 reviewed.    Labs pending: A1c, microalbumin and lipid profile.        2. Type 2 diabetes mellitus without complication, without long-term current use of insulin  Overview:  On Metformin 500 twice daily.  Continue ADA diet; limit intake of sugary foods and refined carbohydrates.  Eye exam 7/2022 Campbell Dial MD; she sees him every 6 months.  Foot exam 3/2023.  Microalbumin 3/2023--on ACEi.  A1c and microalbumin pending.    3/04/2024:   Continue ADA diet; limit intake of sugary foods refined carbohydrates.    Patient encouraged to remain physically active and to lose weight.    Last eye exam 08/03/2023.    Foot exam performed today.    A1c and microalbumin level pending.    Last eye exam 8/03/2023: Dr Campbell Dial.  Foot exam today.      Assessment & Plan:  Continue ADA diet; limit intake of sugary foods and refined carbohydrates.    Patient encouraged to remain physically active, increase exercise and lose weight.    Last eye exam 08/2024: Dr. Dial.  Foot exam performed today.  A1c and microalbumin level pending.    Orders:  -     metFORMIN (GLUCOPHAGE) 500 MG tablet; TAKE 1 TABLET BY MOUTH EVERY MORNING AND TAKE 2 TABLET BY MOUTH EVERY EVENING  Dispense: 270 tablet; Refill: 3  -     Hemoglobin A1C; Future; Expected date: 03/06/2025  -     Microalbumin/Creatinine Ratio, Urine; Future; Expected date: 03/06/2025    3. Hypercholesteremia  Overview:  Patient has been compliant with simvastatin; refills sent.    Continue low-cholesterol/low-fat diet.    Lipid profile checked by oncologist.    03/04/2024: Patient has been compliant medications; refills sent.    Continue low-cholesterol/low-fat diet.    Lipid profile pending.    Assessment & Plan:  Patient has been compliant medications; refills sent.     Continue low-cholesterol/low-fat diet.    Lipid profile pending.    Orders:  -     simvastatin (ZOCOR) 40 MG tablet; TAKE 1 TABLET BY MOUTH EVERY DAY AFTER THE EVENING MEAL  Dispense: 90 tablet; Refill: 3  -     Lipid Panel; Future; Expected date: 06/06/2025    4. Essential (primary) hypertension  Overview:  Well controlled; continue current medications.  Refills sent.    Limit sodium consumption.    03/04/2024:  Her blood pressure is well controlled; continue current medications.  Refills sent.    Low-sodium diet.    Patient encouraged to lose weight.      09/25/2024: Her blood pressure is well controlled; continue current medication.    Assessment & Plan:  Her blood pressure is well controlled; continue current medications.  Refills sent.  See overview.    Orders:  -     amLODIPine (NORVASC) 10 MG tablet; Take 1 tablet (10 mg total) by mouth once daily.  Dispense: 90 tablet; Refill: 3  -     metoprolol succinate (TOPROL-XL) 50 MG 24 hr tablet; Take 1 tablet (50 mg total) by mouth once daily.  Dispense: 90 tablet; Refill: 3    5. Infiltrating ductal carcinoma of left breast  Overview:  Patient has been in remission since 2019.    She is currently on tamoxifen therapy.    She sees Dr. Browne every 6 months.    03/04/2024:  Patient has been in remission since 2019.    She is currently on tamoxifen therapy.    She sees Dr. Browne every 6 months.  Dr. Browne will be retiring; she will be seeing another oncologist.    Assessment & Plan:  Patient has been in remission since 2019.    She is currently on tamoxifen therapy.  Oncology: Dr Greer: 12/2024.      6. Primary insomnia  Overview:  Xanax nightly. Stable.    03/04/2024:  Patient has been sleeping well; continue alprazolam.  Refills sent.    Assessment & Plan:  Patient falls asleep well with alprazolam; she has problems falling asleep.    Continue alprazolam; refills sent.      7. Chronic pain of left knee  Overview:  Chronic; Xray today.  Diclofenac  50mg BID PRN.  Ice.  Knee brace.  Rest.  Low impact exercises as tolerated.     03/04/2024:  Patient states her knee has been doing okay; she has not been taking diclofenac.    She takes ibuprofen as needed.    Assessment & Plan:  Patient states her knee has been doing okay; it bothers her when she is on stairs.  Avoid high impact activities.    Continue ibuprofen as needed.      8. Glaucoma of both eyes, unspecified glaucoma type  Overview:  Stable; followed by Dr. Campbell Dial.     3/04/2024:   Stable; followed by Dr. Campbell Dial.     Assessment & Plan:  Stable; followed by Dr. Campbell Dial.       9. Anxiety  -     ALPRAZolam (XANAX) 1 MG tablet; TAKE 1 TABLET(1 MG) BY MOUTH EVERY NIGHT AS NEEDED FOR INSOMNIA OR ANXIETY  Dispense: 90 tablet; Refill: 1    10. Seasonal allergies  Overview:  Stable; takes Claritin as needed.    03/04/2024:  Stable; takes Claritin as needed.    Assessment & Plan:  Stable; takes Claritin as needed.      11. Immunization declined  Overview:  Patient declines influenza vaccine, Shingrix vaccine, pneumonia vaccine and RSV vaccine; benefits, side effects and risks discussed with patient.      Other orders  -     valsartan-hydrochlorothiazide (DIOVAN-HCT) 320-25 mg per tablet; Take 1 tablet by mouth once daily.  Dispense: 90 tablet; Refill: 3         ..Follow up in about 1 year (around 3/6/2026) for Wellness, With labwork prior to visit.     Future Appointments   Date Time Provider Department Center   3/10/2026 10:00 AM Keshawn Sarkar MD [facility]                    [1]   Social History  Socioeconomic History    Marital status:     Number of children: 2   Occupational History    Occupation:    Tobacco Use    Smoking status: Former     Current packs/day: 0.00     Types: Cigarettes    Smokeless tobacco: Never    Tobacco comments:     Age Started: 18; Age Stopped: 27   Substance and Sexual Activity    Alcohol use: Yes     Comment: occ    Drug use:  Never    Sexual activity: Yes     Birth control/protection: None     Social Drivers of Health     Financial Resource Strain: Low Risk  (3/6/2025)    Overall Financial Resource Strain (CARDIA)     Difficulty of Paying Living Expenses: Not hard at all   Food Insecurity: No Food Insecurity (3/6/2025)    Hunger Vital Sign     Worried About Running Out of Food in the Last Year: Never true     Ran Out of Food in the Last Year: Never true   Transportation Needs: No Transportation Needs (3/6/2025)    PRAPARE - Transportation     Lack of Transportation (Medical): No     Lack of Transportation (Non-Medical): No   Physical Activity: Sufficiently Active (3/6/2025)    Exercise Vital Sign     Days of Exercise per Week: 5 days     Minutes of Exercise per Session: 40 min   Stress: No Stress Concern Present (3/6/2025)    [employer] - Occupational Stress Questionnaire     Feeling of Stress : Not at all   Housing Stability: Low Risk  (3/6/2025)    Housing Stability Vital Sign     Unable to Pay for Housing in the Last Year: No     Homeless in the Last Year: No

## 2025-03-06 ENCOUNTER — LAB VISIT (OUTPATIENT)
Dept: LAB | Facility: HOSPITAL | Age: 60
End: 2025-03-06
Attending: FAMILY MEDICINE
Payer: COMMERCIAL

## 2025-03-06 ENCOUNTER — OFFICE VISIT (OUTPATIENT)
Dept: PRIMARY CARE CLINIC | Facility: CLINIC | Age: 60
End: 2025-03-06
Payer: COMMERCIAL

## 2025-03-06 ENCOUNTER — RESULTS FOLLOW-UP (OUTPATIENT)
Dept: PRIMARY CARE CLINIC | Facility: CLINIC | Age: 60
End: 2025-03-06

## 2025-03-06 VITALS
HEART RATE: 92 BPM | SYSTOLIC BLOOD PRESSURE: 121 MMHG | WEIGHT: 146.5 LBS | DIASTOLIC BLOOD PRESSURE: 81 MMHG | TEMPERATURE: 98 F | OXYGEN SATURATION: 97 % | BODY MASS INDEX: 28.76 KG/M2 | HEIGHT: 60 IN

## 2025-03-06 DIAGNOSIS — E11.9 TYPE 2 DIABETES MELLITUS WITHOUT COMPLICATION, WITHOUT LONG-TERM CURRENT USE OF INSULIN: ICD-10-CM

## 2025-03-06 DIAGNOSIS — Z28.21 IMMUNIZATION DECLINED: ICD-10-CM

## 2025-03-06 DIAGNOSIS — E78.00 HYPERCHOLESTEREMIA: ICD-10-CM

## 2025-03-06 DIAGNOSIS — H40.9 GLAUCOMA OF BOTH EYES, UNSPECIFIED GLAUCOMA TYPE: ICD-10-CM

## 2025-03-06 DIAGNOSIS — G89.29 CHRONIC PAIN OF LEFT KNEE: ICD-10-CM

## 2025-03-06 DIAGNOSIS — I10 ESSENTIAL (PRIMARY) HYPERTENSION: ICD-10-CM

## 2025-03-06 DIAGNOSIS — M25.562 CHRONIC PAIN OF LEFT KNEE: ICD-10-CM

## 2025-03-06 DIAGNOSIS — J30.2 SEASONAL ALLERGIES: ICD-10-CM

## 2025-03-06 DIAGNOSIS — Z00.00 ENCOUNTER FOR WELLNESS EXAMINATION IN ADULT: Primary | ICD-10-CM

## 2025-03-06 DIAGNOSIS — C50.912 INFILTRATING DUCTAL CARCINOMA OF LEFT BREAST: ICD-10-CM

## 2025-03-06 DIAGNOSIS — F51.01 PRIMARY INSOMNIA: ICD-10-CM

## 2025-03-06 DIAGNOSIS — F41.9 ANXIETY: ICD-10-CM

## 2025-03-06 LAB
CHOLEST SERPL-MCNC: 174 MG/DL
CHOLEST/HDLC SERPL: 3 {RATIO} (ref 0–5)
CREAT UR-MCNC: 13 MG/DL (ref 45–106)
EST. AVERAGE GLUCOSE BLD GHB EST-MCNC: 154.2 MG/DL
HBA1C MFR BLD: 7 %
HDLC SERPL-MCNC: 68 MG/DL (ref 35–60)
LDLC SERPL CALC-MCNC: 77 MG/DL (ref 50–140)
MICROALBUMIN UR-MCNC: <5 UG/ML
MICROALBUMIN/CREAT RATIO PNL UR: ABNORMAL
TRIGL SERPL-MCNC: 145 MG/DL (ref 37–140)
VLDLC SERPL CALC-MCNC: 29 MG/DL

## 2025-03-06 PROCEDURE — 82570 ASSAY OF URINE CREATININE: CPT

## 2025-03-06 PROCEDURE — 80061 LIPID PANEL: CPT

## 2025-03-06 PROCEDURE — 83036 HEMOGLOBIN GLYCOSYLATED A1C: CPT

## 2025-03-06 PROCEDURE — 36415 COLL VENOUS BLD VENIPUNCTURE: CPT

## 2025-03-06 RX ORDER — VALSARTAN AND HYDROCHLOROTHIAZIDE 320; 25 MG/1; MG/1
1 TABLET, FILM COATED ORAL DAILY
Qty: 90 TABLET | Refills: 3 | Status: SHIPPED | OUTPATIENT
Start: 2025-03-06 | End: 2026-03-06

## 2025-03-06 RX ORDER — METFORMIN HYDROCHLORIDE 500 MG/1
TABLET ORAL
Qty: 270 TABLET | Refills: 3 | Status: SHIPPED | OUTPATIENT
Start: 2025-03-06

## 2025-03-06 RX ORDER — METOPROLOL SUCCINATE 50 MG/1
50 TABLET, EXTENDED RELEASE ORAL DAILY
Qty: 90 TABLET | Refills: 3 | Status: SHIPPED | OUTPATIENT
Start: 2025-03-06

## 2025-03-06 RX ORDER — ALPRAZOLAM 1 MG/1
TABLET ORAL
Qty: 90 TABLET | Refills: 1 | Status: SHIPPED | OUTPATIENT
Start: 2025-03-06

## 2025-03-06 RX ORDER — SIMVASTATIN 40 MG/1
TABLET, FILM COATED ORAL
Qty: 90 TABLET | Refills: 3 | Status: SHIPPED | OUTPATIENT
Start: 2025-03-06

## 2025-03-06 RX ORDER — AMLODIPINE BESYLATE 10 MG/1
10 TABLET ORAL DAILY
Qty: 90 TABLET | Refills: 3 | Status: SHIPPED | OUTPATIENT
Start: 2025-03-06

## 2025-03-06 NOTE — ASSESSMENT & PLAN NOTE
Patient has been compliant medications; refills sent.    Continue low-cholesterol/low-fat diet.    Lipid profile pending.

## 2025-03-06 NOTE — ASSESSMENT & PLAN NOTE
Patient has been in remission since 2019.    She is currently on tamoxifen therapy.  Oncology: Dr Greer: 12/2024.

## 2025-03-06 NOTE — ASSESSMENT & PLAN NOTE
Her blood pressure is well controlled; continue current medications.  Refills sent.  See overview.

## 2025-03-06 NOTE — ASSESSMENT & PLAN NOTE
Patient states her knee has been doing okay; it bothers her when she is on stairs.  Avoid high impact activities.    Continue ibuprofen as needed.

## 2025-03-06 NOTE — ASSESSMENT & PLAN NOTE
Continue ADA diet; limit intake of sugary foods and refined carbohydrates.    Patient encouraged to remain physically active, increase exercise and lose weight.    Last eye exam 08/2024: Dr. Dial.  Foot exam performed today.  A1c and microalbumin level pending.

## 2025-03-06 NOTE — ASSESSMENT & PLAN NOTE
Patient falls asleep well with alprazolam; she has problems falling asleep.    Continue alprazolam; refills sent.

## 2025-03-06 NOTE — ASSESSMENT & PLAN NOTE
Patient presents for wellness examination.    She has been feeling well.  Colonoscopy 06/30/2023: Dr Wolfe; normal, follow-up in 7 years.    Oncologist: Dr Greer; December 2024.   History of breast cancer; has been in remission since 2019.  Patient has finished 5 years of tamoxifen.  Patient encouraged to remain physically active.    Patient encouraged to make healthy food choices and limit portions  Patient declines pneumonia, RSV and Shingrix vaccines.  CMP and CBC from December 2024 reviewed.    Labs pending: A1c, microalbumin and lipid profile.

## 2025-04-11 ENCOUNTER — TELEPHONE (OUTPATIENT)
Dept: PRIMARY CARE CLINIC | Facility: CLINIC | Age: 60
End: 2025-04-11
Payer: COMMERCIAL

## 2025-04-11 NOTE — TELEPHONE ENCOUNTER
----- Message from Ruben sent at 4/10/2025  4:32 PM CDT -----  Who Called: Farideh Aragon is requesting assistance/information from provider's office.Symptoms (please be specific):  How long has patient had these symptoms:  List of preferred pharmacies on file (remove unneeded): [unfilled]If different, enter pharmacy into here including location and phone number: Preferred Method of Contact: Phone CallPatient's Preferred Phone Number on File: [phone] Best Call Back Number, if different:Additional Information: Pt states pharmacy needs a prior authorization sent in for the ALPRAZolam (XANAX) 1 MG tablet before refilling medication.

## 2025-04-16 DIAGNOSIS — F41.9 ANXIETY: ICD-10-CM

## 2025-04-16 RX ORDER — ALPRAZOLAM 1 MG/1
TABLET ORAL
Qty: 90 TABLET | Refills: 1 | Status: SHIPPED | OUTPATIENT
Start: 2025-04-16

## 2025-06-02 DIAGNOSIS — I10 ESSENTIAL (PRIMARY) HYPERTENSION: ICD-10-CM

## 2025-06-02 RX ORDER — METOPROLOL SUCCINATE 50 MG/1
50 TABLET, EXTENDED RELEASE ORAL DAILY
Qty: 90 TABLET | Refills: 3 | Status: SHIPPED | OUTPATIENT
Start: 2025-06-02

## (undated) DEVICE — CARTRIDGE SHOOTER

## (undated) DEVICE — PACK EYE LEONI DISPOSABLE

## (undated) DEVICE — TAPE CURAD PAPER 1INX1.5YD

## (undated) DEVICE — GLOVE SENSICARE PI MICRO 7.5

## (undated) DEVICE — BAG RECLOSABLE 2MIL 4X6IN

## (undated) DEVICE — ADAPTER DUAL NSL LUER M-M 7FT

## (undated) DEVICE — GOWN POLY REINF BRTH SLV XL

## (undated) DEVICE — TIP I & A

## (undated) DEVICE — PACK CASSETTE TUBE ELLIPS FX

## (undated) DEVICE — SYR W NDL BLN FILL 5ML 18GX1.5

## (undated) DEVICE — SYR 3ML LL 18GA 1.5IN

## (undated) DEVICE — TIP PHACO 30 DEG BEVEL 21GA

## (undated) DEVICE — Device